# Patient Record
Sex: MALE | Race: WHITE | Employment: FULL TIME | URBAN - METROPOLITAN AREA
[De-identification: names, ages, dates, MRNs, and addresses within clinical notes are randomized per-mention and may not be internally consistent; named-entity substitution may affect disease eponyms.]

---

## 2017-03-03 ENCOUNTER — HOSPITAL ENCOUNTER (OUTPATIENT)
Age: 32
Setting detail: SPECIMEN
Discharge: HOME | End: 2017-03-03
Payer: MEDICARE

## 2017-03-03 ENCOUNTER — OFFICE VISIT (OUTPATIENT)
Dept: FAMILY MEDICINE CLINIC | Facility: CLINIC | Age: 32
End: 2017-03-03

## 2017-03-03 VITALS
HEART RATE: 69 BPM | DIASTOLIC BLOOD PRESSURE: 76 MMHG | WEIGHT: 250.8 LBS | HEIGHT: 67 IN | BODY MASS INDEX: 39.36 KG/M2 | RESPIRATION RATE: 20 BRPM | TEMPERATURE: 97.4 F | SYSTOLIC BLOOD PRESSURE: 138 MMHG

## 2017-03-03 DIAGNOSIS — L72.9 SKIN CYSTS, GENERALIZED: Primary | ICD-10-CM

## 2017-03-03 LAB
ALBUMIN SERPL-MCNC: 3.8 G/DL (ref 3.5–5.2)
ALBUMIN/GLOBULIN RATIO: 1.4 (ref 1–2.5)
ALP BLD-CCNC: 90 U/L (ref 40–129)
ALT SERPL-CCNC: 23 U/L (ref 5–41)
ANION GAP SERPL CALCULATED.3IONS-SCNC: 11 MMOL/L (ref 9–17)
AST SERPL-CCNC: 18 U/L
BILIRUB SERPL-MCNC: <0.1 MG/DL (ref 0.3–1.2)
BUN BLDV-MCNC: 9 MG/DL (ref 6–20)
BUN/CREAT BLD: ABNORMAL (ref 9–20)
CALCIUM SERPL-MCNC: 8.5 MG/DL (ref 8.6–10.4)
CHLORIDE BLD-SCNC: 102 MMOL/L (ref 98–107)
CHOLESTEROL/HDL RATIO: 4.5
CHOLESTEROL: 161 MG/DL
CO2: 25 MMOL/L (ref 20–31)
CREAT SERPL-MCNC: 1.01 MG/DL (ref 0.7–1.2)
GFR AFRICAN AMERICAN: >60 ML/MIN
GFR NON-AFRICAN AMERICAN: >60 ML/MIN
GFR SERPL CREATININE-BSD FRML MDRD: ABNORMAL ML/MIN/{1.73_M2}
GFR SERPL CREATININE-BSD FRML MDRD: ABNORMAL ML/MIN/{1.73_M2}
GLUCOSE BLD-MCNC: 93 MG/DL (ref 70–99)
HDLC SERPL-MCNC: 36 MG/DL
LDL CHOLESTEROL: 100 MG/DL (ref 0–130)
POTASSIUM SERPL-SCNC: 4.1 MMOL/L (ref 3.7–5.3)
SODIUM BLD-SCNC: 138 MMOL/L (ref 135–144)
TOTAL PROTEIN: 6.5 G/DL (ref 6.4–8.3)
TRIGL SERPL-MCNC: 126 MG/DL
VLDLC SERPL CALC-MCNC: ABNORMAL MG/DL (ref 1–30)

## 2017-03-03 PROCEDURE — 99213 OFFICE O/P EST LOW 20 MIN: CPT | Performed by: NURSE PRACTITIONER

## 2017-03-12 ASSESSMENT — ENCOUNTER SYMPTOMS
SHORTNESS OF BREATH: 0
COLOR CHANGE: 1
CHEST TIGHTNESS: 0

## 2017-05-26 DIAGNOSIS — J45.40 MODERATE PERSISTENT ASTHMA WITHOUT COMPLICATION: ICD-10-CM

## 2017-05-26 RX ORDER — ALBUTEROL SULFATE 90 UG/1
2 AEROSOL, METERED RESPIRATORY (INHALATION) EVERY 6 HOURS PRN
Qty: 1 INHALER | Refills: 0 | Status: SHIPPED | OUTPATIENT
Start: 2017-05-26 | End: 2017-06-23 | Stop reason: SDUPTHER

## 2017-06-23 DIAGNOSIS — J45.40 MODERATE PERSISTENT ASTHMA WITHOUT COMPLICATION: ICD-10-CM

## 2017-06-23 NOTE — TELEPHONE ENCOUNTER
From: Eliane Dozier  To: [address], CNP  Sent: 6/23/2017 2:32 PM EDT  Subject: Medication Renewal Request    Original authorizing provider: [address] would like a refill of the following medications:  albuterol sulfate HFA (VENTOLIN HFA) 108 (90 BASE) MCG/ACT inhaler [address], CNP]    Preferred pharmacy: [address], [address] [address] [phone] - F [phone]    Comment:

## 2017-06-26 RX ORDER — ALBUTEROL SULFATE 90 UG/1
2 AEROSOL, METERED RESPIRATORY (INHALATION) EVERY 6 HOURS PRN
Qty: 1 INHALER | Refills: 0 | Status: SHIPPED | OUTPATIENT
Start: 2017-06-26 | End: 2020-01-07

## 2017-06-26 NOTE — TELEPHONE ENCOUNTER
Patient needs to follow up in office. It is unsafe to require refills this often, needs to have controller medication.

## 2017-08-01 DIAGNOSIS — J45.40 MODERATE PERSISTENT ASTHMA WITHOUT COMPLICATION: ICD-10-CM

## 2019-10-08 ENCOUNTER — APPOINTMENT (OUTPATIENT)
Dept: GENERAL RADIOLOGY | Age: 34
End: 2019-10-08
Payer: COMMERCIAL

## 2019-10-08 ENCOUNTER — HOSPITAL ENCOUNTER (EMERGENCY)
Age: 34
Discharge: HOME | End: 2019-10-08
Attending: EMERGENCY MEDICINE
Payer: COMMERCIAL

## 2019-10-08 VITALS
TEMPERATURE: 98.1 F | HEART RATE: 74 BPM | OXYGEN SATURATION: 99 % | SYSTOLIC BLOOD PRESSURE: 119 MMHG | RESPIRATION RATE: 16 BRPM | DIASTOLIC BLOOD PRESSURE: 99 MMHG | HEIGHT: 67 IN | WEIGHT: 236 LBS | BODY MASS INDEX: 37.04 KG/M2

## 2019-10-08 DIAGNOSIS — S62.639A CLOSED FRACTURE OF TUFT OF DISTAL PHALANX OF FINGER: Primary | ICD-10-CM

## 2019-10-08 PROCEDURE — 6370000000 HC RX 637 (ALT 250 FOR IP): Performed by: NURSE PRACTITIONER

## 2019-10-08 PROCEDURE — 73130 X-RAY EXAM OF HAND: CPT

## 2019-10-08 PROCEDURE — 99283 EMERGENCY DEPT VISIT LOW MDM: CPT

## 2019-10-08 PROCEDURE — 29130 APPL FINGER SPLINT STATIC: CPT

## 2019-10-08 PROCEDURE — 73030 X-RAY EXAM OF SHOULDER: CPT

## 2019-10-08 RX ORDER — HYDROCODONE BITARTRATE AND ACETAMINOPHEN 5; 325 MG/1; MG/1
1 TABLET ORAL EVERY 6 HOURS PRN
Qty: 20 TABLET | Refills: 0 | Status: SHIPPED | OUTPATIENT
Start: 2019-10-08 | End: 2019-10-11

## 2019-10-08 RX ORDER — HYDROCODONE BITARTRATE AND ACETAMINOPHEN 5; 325 MG/1; MG/1
1 TABLET ORAL ONCE
Status: COMPLETED | OUTPATIENT
Start: 2019-10-08 | End: 2019-10-08

## 2019-10-08 RX ADMIN — HYDROCODONE BITARTRATE AND ACETAMINOPHEN 1 TABLET: 5; 325 TABLET ORAL at 09:53

## 2019-10-08 ASSESSMENT — PAIN SCALES - GENERAL
PAINLEVEL_OUTOF10: 5
PAINLEVEL_OUTOF10: 5
PAINLEVEL_OUTOF10: 4

## 2019-10-08 ASSESSMENT — PAIN DESCRIPTION - PAIN TYPE
TYPE: ACUTE PAIN
TYPE: ACUTE PAIN

## 2019-10-08 ASSESSMENT — PAIN DESCRIPTION - FREQUENCY: FREQUENCY: CONTINUOUS

## 2019-10-08 ASSESSMENT — PAIN DESCRIPTION - DESCRIPTORS: DESCRIPTORS: CONSTANT;SORE;ACHING

## 2019-10-08 ASSESSMENT — PAIN DESCRIPTION - ORIENTATION: ORIENTATION: LEFT

## 2019-10-08 ASSESSMENT — PAIN DESCRIPTION - LOCATION: LOCATION: HAND;WRIST;ELBOW;SHOULDER

## 2019-12-27 ENCOUNTER — HOSPITAL ENCOUNTER (EMERGENCY)
Age: 34
Discharge: HOME | End: 2019-12-27
Attending: EMERGENCY MEDICINE
Payer: MEDICARE

## 2019-12-27 ENCOUNTER — APPOINTMENT (OUTPATIENT)
Dept: GENERAL RADIOLOGY | Age: 34
End: 2019-12-27
Payer: MEDICARE

## 2019-12-27 VITALS
WEIGHT: 220 LBS | HEART RATE: 66 BPM | BODY MASS INDEX: 34.53 KG/M2 | OXYGEN SATURATION: 98 % | DIASTOLIC BLOOD PRESSURE: 82 MMHG | TEMPERATURE: 98.4 F | RESPIRATION RATE: 16 BRPM | SYSTOLIC BLOOD PRESSURE: 154 MMHG | HEIGHT: 67 IN

## 2019-12-27 DIAGNOSIS — M54.42 ACUTE LOW BACK PAIN WITH LEFT-SIDED SCIATICA, UNSPECIFIED BACK PAIN LATERALITY: Primary | ICD-10-CM

## 2019-12-27 PROCEDURE — 6360000002 HC RX W HCPCS: Performed by: NURSE PRACTITIONER

## 2019-12-27 PROCEDURE — 99283 EMERGENCY DEPT VISIT LOW MDM: CPT

## 2019-12-27 PROCEDURE — 96372 THER/PROPH/DIAG INJ SC/IM: CPT

## 2019-12-27 PROCEDURE — 72100 X-RAY EXAM L-S SPINE 2/3 VWS: CPT

## 2019-12-27 RX ORDER — PREDNISONE 20 MG/1
20 TABLET ORAL 2 TIMES DAILY
Qty: 10 TABLET | Refills: 0 | Status: SHIPPED | OUTPATIENT
Start: 2019-12-27 | End: 2020-01-06

## 2019-12-27 RX ORDER — DEXAMETHASONE SODIUM PHOSPHATE 10 MG/ML
10 INJECTION INTRAMUSCULAR; INTRAVENOUS ONCE
Status: COMPLETED | OUTPATIENT
Start: 2019-12-27 | End: 2019-12-27

## 2019-12-27 RX ORDER — METHOCARBAMOL 750 MG/1
750 TABLET, FILM COATED ORAL 4 TIMES DAILY
Qty: 40 TABLET | Refills: 0 | Status: SHIPPED | OUTPATIENT
Start: 2019-12-27 | End: 2020-01-06

## 2019-12-27 RX ORDER — KETOROLAC TROMETHAMINE 30 MG/ML
60 INJECTION, SOLUTION INTRAMUSCULAR; INTRAVENOUS ONCE
Status: COMPLETED | OUTPATIENT
Start: 2019-12-27 | End: 2019-12-27

## 2019-12-27 RX ORDER — ACETAMINOPHEN AND CODEINE PHOSPHATE 300; 30 MG/1; MG/1
1 TABLET ORAL EVERY 6 HOURS PRN
Qty: 12 TABLET | Refills: 0 | Status: SHIPPED | OUTPATIENT
Start: 2019-12-27 | End: 2019-12-30

## 2019-12-27 RX ADMIN — DEXAMETHASONE SODIUM PHOSPHATE 10 MG: 10 INJECTION INTRAMUSCULAR; INTRAVENOUS at 12:22

## 2019-12-27 RX ADMIN — KETOROLAC TROMETHAMINE 60 MG: 30 INJECTION, SOLUTION INTRAMUSCULAR at 12:19

## 2019-12-27 ASSESSMENT — ENCOUNTER SYMPTOMS
COLOR CHANGE: 0
CONSTIPATION: 0
WHEEZING: 0
ABDOMINAL PAIN: 0
SINUS PRESSURE: 0
VOMITING: 0
DIARRHEA: 0
SHORTNESS OF BREATH: 0
SORE THROAT: 0
COUGH: 0
RHINORRHEA: 0
NAUSEA: 0

## 2019-12-27 ASSESSMENT — PAIN SCALES - GENERAL
PAINLEVEL_OUTOF10: 7
PAINLEVEL_OUTOF10: 8
PAINLEVEL_OUTOF10: 10

## 2019-12-30 ENCOUNTER — PATIENT MESSAGE (OUTPATIENT)
Dept: NEUROSURGERY | Age: 34
End: 2019-12-30

## 2020-01-03 ENCOUNTER — TELEPHONE (OUTPATIENT)
Dept: FAMILY MEDICINE CLINIC | Age: 35
End: 2020-01-03

## 2020-01-03 NOTE — TELEPHONE ENCOUNTER
Patient is coming in to see you on the 7th. No openings before then. Patient has been to [facility] and [facility] for back pain and wife states he cant even get out of bed it is so bad. She is asking what she should do. They saw neuro and were not impressed. They forgot to order MRI and did not seem to have it together, per wife. She states patient is currently in bed crying and urinating in a urinal can given to him at discharge from the hospital.       I did advise since we have not seen him, we may not be able to treat and advised ED if worsening symptoms.            Next Visit Date:  Future Appointments   Date Time Provider Yari Bazan   1/7/2020  7:40 AM MINA Rios CNP [facility] [address] Maintenance   Topic Date Due    Varicella Vaccine (1 of 2 - 2-dose childhood series) 09/11/1984    Pneumococcal 0-64 years Vaccine (1 of 1 - PPSV23) 09/11/1989    HIV screen  09/11/1998    Flu vaccine (1) 09/01/2019    DTaP/Tdap/Td vaccine (2 - Td) 02/19/2024       No results found for: LABA1C          ( goal A1C is < 7)   No results found for: LABMICR  LDL Cholesterol (mg/dL)   Date Value   03/03/2017 100       (goal LDL is <100)   AST (U/L)   Date Value   03/03/2017 18     ALT (U/L)   Date Value   03/03/2017 23     BUN (mg/dL)   Date Value   03/03/2017 9     BP Readings from Last 3 Encounters:   12/27/19 (!) 154/82   10/08/19 (!) 119/99   03/03/17 138/76          (goal 120/80)    All Future Testing planned in [facility]              Patient Active Problem List:     Tobacco abuse     Other anxiety states     Sciatic nerve pain     Lipoma of back     Mild persistent asthma without complication     Unruptured synovial cyst of left popliteal space

## 2020-01-04 NOTE — TELEPHONE ENCOUNTER
Yes, unfortunately without being seen we can't prescribe any opioid type medications. This is just a legality issue. I could send over some pain patches or creams if they want to try these. -Anil ENRIQUEZ-CNP

## 2020-01-07 ENCOUNTER — OFFICE VISIT (OUTPATIENT)
Dept: FAMILY MEDICINE CLINIC | Age: 35
End: 2020-01-07
Payer: MEDICARE

## 2020-01-07 VITALS
RESPIRATION RATE: 20 BRPM | OXYGEN SATURATION: 97 % | BODY MASS INDEX: 37.2 KG/M2 | TEMPERATURE: 98.3 F | SYSTOLIC BLOOD PRESSURE: 126 MMHG | DIASTOLIC BLOOD PRESSURE: 80 MMHG | WEIGHT: 237.5 LBS | HEART RATE: 116 BPM

## 2020-01-07 PROCEDURE — 99214 OFFICE O/P EST MOD 30 MIN: CPT | Performed by: NURSE PRACTITIONER

## 2020-01-07 PROCEDURE — G8417 CALC BMI ABV UP PARAM F/U: HCPCS | Performed by: NURSE PRACTITIONER

## 2020-01-07 PROCEDURE — 4004F PT TOBACCO SCREEN RCVD TLK: CPT | Performed by: NURSE PRACTITIONER

## 2020-01-07 PROCEDURE — G8427 DOCREV CUR MEDS BY ELIG CLIN: HCPCS | Performed by: NURSE PRACTITIONER

## 2020-01-07 PROCEDURE — G8484 FLU IMMUNIZE NO ADMIN: HCPCS | Performed by: NURSE PRACTITIONER

## 2020-01-07 RX ORDER — GABAPENTIN 100 MG/1
100 CAPSULE ORAL 3 TIMES DAILY
COMMUNITY
Start: 2019-12-31 | End: 2020-01-09

## 2020-01-07 RX ORDER — HYDROCODONE BITARTRATE AND ACETAMINOPHEN 5; 325 MG/1; MG/1
TABLET ORAL PRN
COMMUNITY
Start: 2020-01-01 | End: 2020-01-16 | Stop reason: ALTCHOICE

## 2020-01-07 RX ORDER — TRAMADOL HYDROCHLORIDE 50 MG/1
50 TABLET ORAL EVERY 6 HOURS PRN
Qty: 20 TABLET | Refills: 0 | Status: SHIPPED | OUTPATIENT
Start: 2020-01-07 | End: 2020-01-18 | Stop reason: SDUPTHER

## 2020-01-07 ASSESSMENT — ENCOUNTER SYMPTOMS
BACK PAIN: 1
NAUSEA: 0
ABDOMINAL PAIN: 0
SHORTNESS OF BREATH: 0
DIARRHEA: 0
COUGH: 0
CONSTIPATION: 0

## 2020-01-07 ASSESSMENT — PATIENT HEALTH QUESTIONNAIRE - PHQ9
SUM OF ALL RESPONSES TO PHQ QUESTIONS 1-9: 0
SUM OF ALL RESPONSES TO PHQ QUESTIONS 1-9: 0
SUM OF ALL RESPONSES TO PHQ9 QUESTIONS 1 & 2: 0
1. LITTLE INTEREST OR PLEASURE IN DOING THINGS: 0
2. FEELING DOWN, DEPRESSED OR HOPELESS: 0

## 2020-01-07 NOTE — PROGRESS NOTES
Right Ear: External ear normal. No middle ear effusion. Left Ear: External ear normal.  No middle ear effusion. Nose: Nose normal.      Mouth/Throat:      Pharynx: Uvula midline. No oropharyngeal exudate. Eyes:      General:         Right eye: No discharge. Left eye: No discharge. Pupils: Pupils are equal, round, and reactive to light. Cardiovascular:      Rate and Rhythm: Normal rate and regular rhythm. Pulses:           Carotid pulses are 2+ on the right side and 2+ on the left side. Radial pulses are 2+ on the right side and 2+ on the left side. Dorsalis pedis pulses are 2+ on the right side and 2+ on the left side. Posterior tibial pulses are 2+ on the right side and 2+ on the left side. Heart sounds: Normal heart sounds. No murmur. Pulmonary:      Effort: Pulmonary effort is normal. No respiratory distress. Breath sounds: Normal breath sounds. No decreased breath sounds or wheezing. Abdominal:      General: Bowel sounds are normal.      Palpations: Abdomen is soft. Musculoskeletal:         General: Swelling (lumbar spine) present. Lumbar back: He exhibits decreased range of motion, tenderness, bony tenderness and pain. He exhibits no swelling. Back:         Legs:       Comments: No visible red or swollen joints to bilateral upper and lower extremities. Using crutches today. Weakness in left lower extremity    Lymphadenopathy:      Cervical: No cervical adenopathy. Skin:     General: Skin is warm and dry. Capillary Refill: Capillary refill takes less than 2 seconds. Findings: No rash. Neurological:      Mental Status: He is alert and oriented to person, place, and time. Psychiatric:         Speech: Speech normal.         Behavior: Behavior normal.         Thought Content: Thought content normal.       Diagnoses:      Diagnosis Orders   1.  Acute left-sided low back pain with left-sided sciatica  [facility] CAROLEE Lang, Neurosurgery, [address]    traMADol (ULTRAM) 50 MG tablet    MRI LUMBAR SPINE WO CONTRAST   2. Numbness and tingling of left lower extremity  Kiarra Bess CNP, Neurosurgery, [address]    MRI LUMBAR SPINE WO CONTRAST   3. Weakness of left lower extremity  Kiarra Bess CNP, Neurosurgery, [address]    MRI LUMBAR SPINE WO CONTRAST     Plan:     Items for Patient/Writer/Staff to Accomplish  Discussed acute musculoskeletal pain. Discussed course that we must take due to insurance and to heal this possible injury. Begin antiinflammatory medication as prescribed. Tramadol for pain, contract signed,   Titrate Gabapentin. Exercised provided. Referral to PT discussed. Continue with MRI as ordered. Additional imaging possible in future. Referral to Ortho possible in future. Encouraged healthy diet and exercise.  Call office with any new or worsening symptoms or concerns.     MRI order stat due to weakness. January Dyer in room for assessment, diagnosis, and treatment planning. Plan discussed with Zack HOLLEY, both parties agree on next steps. Eduardo Phylicia received counseling on the following healthy behaviors: nutrition, exercise and medication adherence  Reviewed prior labs and health maintenance. Continue current medications, diet and exercise. Discussed use, benefit, and side effects of prescribed medications. Barriers to medication compliance addressed. Patient given educational materials - see patient instructions. All patient questions answered. Patient voiced understanding. Return if symptoms worsen or fail to improve. \"There is beauty in all things if you choose to see it\"    Zack Parker, ARLET, LE[address] in [facility]  [email or web address]   Office: [phone]   Cell: [phone]    Electronically signed by MINA Cardenas CNP on 1/7/2020 at 6:09 PM

## 2020-01-09 RX ORDER — GABAPENTIN 300 MG/1
300 CAPSULE ORAL 3 TIMES DAILY
Qty: 270 CAPSULE | Refills: 0 | Status: SHIPPED | OUTPATIENT
Start: 2020-01-09 | End: 2020-05-29 | Stop reason: SDUPTHER

## 2020-01-09 RX ORDER — GABAPENTIN 400 MG/1
400 CAPSULE ORAL 3 TIMES DAILY
Qty: 270 CAPSULE | Refills: 0 | Status: SHIPPED | OUTPATIENT
Start: 2020-01-09 | End: 2020-09-15

## 2020-01-09 RX ORDER — GABAPENTIN 100 MG/1
700 CAPSULE ORAL 3 TIMES DAILY
Qty: 630 CAPSULE | Refills: 0 | Status: CANCELLED | OUTPATIENT
Start: 2020-01-09 | End: 2021-01-09

## 2020-01-15 ENCOUNTER — HOSPITAL ENCOUNTER (OUTPATIENT)
Dept: MRI IMAGING | Facility: CLINIC | Age: 35
Discharge: HOME | End: 2020-01-17
Payer: MEDICARE

## 2020-01-15 PROCEDURE — 72148 MRI LUMBAR SPINE W/O DYE: CPT

## 2020-01-16 ENCOUNTER — TELEPHONE (OUTPATIENT)
Dept: FAMILY MEDICINE CLINIC | Age: 35
End: 2020-01-16

## 2020-01-16 ENCOUNTER — OFFICE VISIT (OUTPATIENT)
Dept: NEUROSURGERY | Age: 35
End: 2020-01-16
Payer: MEDICARE

## 2020-01-16 VITALS — RESPIRATION RATE: 16 BRPM | HEART RATE: 106 BPM | DIASTOLIC BLOOD PRESSURE: 98 MMHG | SYSTOLIC BLOOD PRESSURE: 150 MMHG

## 2020-01-16 PROCEDURE — 99203 OFFICE O/P NEW LOW 30 MIN: CPT | Performed by: NURSE PRACTITIONER

## 2020-01-16 PROCEDURE — G8484 FLU IMMUNIZE NO ADMIN: HCPCS | Performed by: NURSE PRACTITIONER

## 2020-01-16 PROCEDURE — G8427 DOCREV CUR MEDS BY ELIG CLIN: HCPCS | Performed by: NURSE PRACTITIONER

## 2020-01-16 PROCEDURE — G8417 CALC BMI ABV UP PARAM F/U: HCPCS | Performed by: NURSE PRACTITIONER

## 2020-01-16 PROCEDURE — 4004F PT TOBACCO SCREEN RCVD TLK: CPT | Performed by: NURSE PRACTITIONER

## 2020-01-16 NOTE — PROGRESS NOTES
Onset    Diabetes Father         Type 1    Heart Disease Maternal Grandmother     Diabetes Maternal Grandfather      Current Outpatient Medications on File Prior to Visit   Medication Sig Dispense Refill    gabapentin (NEURONTIN) 300 MG capsule Take 1 capsule by mouth 3 times daily for 90 days. 270 capsule 0    gabapentin (NEURONTIN) 400 MG capsule Take 1 capsule by mouth 3 times daily for 90 days. 270 capsule 0     No current facility-administered medications on file prior to visit. Social History     Tobacco Use    Smoking status: Current Every Day Smoker     Packs/day: 1.00     Years: 20.00     Pack years: 20.00     Types: Cigarettes     Start date: 8/24/1995    Smokeless tobacco: Never Used   Substance Use Topics    Alcohol use: Yes     Alcohol/week: 0.0 standard drinks     Comment: social    Drug use: No       No Known Allergies    Review of Systems  Constitutional: Negative for activity change and appetite change. HENT: Negative for ear pain and facial swelling. Eyes: Negative for discharge and itching. Respiratory: Negative for choking and chest tightness. Cardiovascular: Negative for chest pain and leg swelling. Gastrointestinal: Negative for nausea and abdominal pain. Endocrine: Negative for cold intolerance and heat intolerance. Genitourinary: Negative for frequency and flank pain. Musculoskeletal: Negative for myalgias and joint swelling. Skin: Negative for rash and wound. Allergic/Immunologic: Negative for environmental allergies and food allergies. Hematological: Negative for adenopathy. Does not bruise/bleed easily. Psychiatric/Behavioral: Negative for self-injury. The patient is not nervous/anxious. Physical Exam:      BP (!) 150/98   Pulse 106   Resp 16   Estimated body mass index is 37.2 kg/m² as calculated from the following:    Height as of 12/27/19: 5' 7\" (1.702 m). Weight as of 1/7/20: 237 lb 8 oz (107.7 kg). General:  Shasta Councilman is a 37 y.o. year old male who appears his stated age. HEENT: Normocephalic atraumatic. Neck supple. Chest: regular rate; pulses equal  Abdomen: Soft nontender nondistended. Ext: DP and PT pulses 2+, good cap refill  Neuro    Mentation  Appropriate affect  Registration intact  Orientation intact    Cranial Nerves:   Pupils equal and reactive to light  Extraocular motion intact  Face and shrug symmetric  Tongue midline  No dysarthria  v1-3 sensation symmetric, masseter tone symmetric  Hearing symmetric and intact    Sensation: Mild decreased along L4 distribution to left leg    Motor  L deltoid 5/5; R deltoid 5/5  L biceps 5/5; R biceps 5/5  L triceps 5/5; R triceps 5/5  L wrist extension 5/5; R wrist extension 5/5  L intrinsics 5/5; R intrinsics 5/5     L iliopsoas 5/5 , R iliopsoas 5/5  L quadriceps 5/5; R quadriceps 5/5  L Dorsiflexion 5/5; R dorsiflexion 5/5  L Plantarflexion 5/5; R plantarflexion 5/5  L EHL 5/5; R EHL 5/5    Reflexes  L Brachioradialis 2+/4; R brachioradialis 2+/4  L Biceps 2+/4; R Biceps 2+/4  L Triceps 2+/4; R Triceps 2+/4  L Patellar 2+/4: R Patellar 2+/4  L Achilles 2+/4; R Achilles 2+/4    hoffmans L: neg  hoffmans R: neg  Clonus L: neg  Clonus R: neg  Babinski L: neg  Babinski R: neg    ALEXIS: Negative  Straight leg raise: Negative    Studies Review:     MRI lumbar spine 1/14/2020:  Congenital spinal canal stenosis.       Small left foraminal disc extrusion at L3-L4 resulting in severe left   foraminal stenosis and compression of the exiting left L3 nerve root.       Moderate right foraminal stenosis at L3-L4.       Small left paracentral disc protrusion at L5-S1 resulting in mild left   lateral recess stenosis. Assessment and Plan:      1. Lumbar radiculopathy          Plan: Patient is experiencing axial back pain with left sided radicular symptoms. This does correspond with severe left foraminal stenosis identified on MRI.   The patient has not attempted physical therapy or pain management evaluation as of yet. As he is not experiencing any focal motor deficits, would recommend trial of physical therapy as well as evaluation by pain specialist to consider injections. He has experienced modest improvement in symptoms over the last 3 weeks although pain is still significant. Will reevaluate in approximately 10 to 12 weeks. Patient to notify office with any worsening of radicular symptoms, weakness, progressive numbness/tingling. Followup: Return in about 3 months (around 4/16/2020), or if symptoms worsen or fail to improve. Prescriptions Ordered:  No orders of the defined types were placed in this encounter. Orders Placed:  Orders Placed This Encounter   Procedures    [facility]     Referral Priority:   Routine     Referral Type:   Eval and Treat     Referral Reason:   Specialty Services Required     Requested Specialty:   Physical Therapy     Number of Visits Requested:   1    Ambulatory referral to Pain Clinic     Referral Priority:   Routine     Referral Type:   Eval and Treat     Referral Reason:   Specialty Services Required     Referred to Provider:   Jorgito Robertson MD     Requested Specialty:   Pain Management     Number of Visits Requested:   1        Electronically signed by MINA Salazar CNP on 1/16/2020 at 3:50 PM    Please note that this chart was generated using voice recognition Dragon dictation software. Although every effort was made to ensure the accuracy of this automated transcription, some errors in transcription may have occurred.

## 2020-01-18 RX ORDER — TRAMADOL HYDROCHLORIDE 50 MG/1
50 TABLET ORAL EVERY 6 HOURS PRN
Qty: 20 TABLET | Refills: 0 | Status: SHIPPED | OUTPATIENT
Start: 2020-01-18 | End: 2020-01-30 | Stop reason: SDUPTHER

## 2020-01-20 ENCOUNTER — HOSPITAL ENCOUNTER (OUTPATIENT)
Dept: PHYSICAL THERAPY | Age: 35
Setting detail: THERAPIES SERIES
Discharge: HOME | End: 2020-01-20
Payer: MEDICARE

## 2020-01-20 PROCEDURE — 97012 MECHANICAL TRACTION THERAPY: CPT

## 2020-01-20 PROCEDURE — 97162 PT EVAL MOD COMPLEX 30 MIN: CPT

## 2020-01-20 NOTE — CONSULTS
stenosis.       Small left foraminal disc extrusion at L3-L4 resulting in severe left   foraminal stenosis and compression of the exiting left L3 nerve root.       Moderate right foraminal stenosis at L3-L4.       Small left paracentral disc protrusion at L5-S1 resulting in mild left   lateral recess stenosis. 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, has been off for 4-6 weeks, hoping to return to, when returns will be able to limit to somewhat lighter activities    Pain:  [x] Yes  [] No Location: LB, L LE Pain Rating: (0-10 scale) 8/10  Pain altered Tx:  [] Yes  [x] No  Action:  Symptoms:  [] Improving [] Worsening [x] Same    Sleep: [] OK    [x] Disturbed-has been taking sleeping pills to help, but still wakes up every night about 3 am; before this stomach sleeper-feels like back is arched, has not been doing per Dr instruction    Objective:      STRENGTH  STRENGTH  ROM    Left Right  Left Right Cervical    C5 Shld Abd   L1-2 Hip Flex 3-pp 4- Flexion    Shld Flexion   Hip Abd   Extension    Shld IR   L3-4 Knee Ext 3-pp 4p Rotation L R   Shld ER   L4 Ankle DF 4-p 4 Sidebend L R   C6 Elb Flex   L5 EHL   Retraction    C7 Elb Ext   S1 Plant. Flex   Lumbar    C8 EPL   Abdominals   Flexion 43°pp   T1 Fing Abd   Erector Spinae   Extension 4°      HS 3+pp 4p Rotation L  R      Hip ext  3-pp 3+p Sidebend L R         UE/LE                                                                  TESTS (+/-) LEFT RIGHT Not Tested   SLR [] sit [] supine   []   Hamstring (SLR)   []   SKTC + +pulls L hip []   DKTC + + []   LTR - + []   Karolyn Tests ? Pain ?  Pain No Change Not Tested RFIS [] [] [] [x]   LUIS [] [] [] [x]   RFIL [] [] [] [x]   REIL [] [] [] [x]   Rep Prot [] [] [] [x]   Rep Retract [] [] [] [x]   Hooklying-most comfortable position 5/10, straightening L leg out causes icnreased pain, tension  Prone increasing pain, back 6/10, going down leg 8/10;  RUTH increases pain in leg 8-9/10, no change in back    OBSERVATION No Deficit Deficit Not Tested Comments   Posture       Forward Head [] [x] []    Rounded Shoulders [] [x] []    Slumped Sitting [] [x] []    Palpation [] [x] [] Tender L LB, L post thigh   Sensation [] [x] [] See subjective, intact light touch   Edema [] [] [x]    Neurological [] [] [x]          Comments: Pt very talkative during evaluation, about symptoms, recent history, mild difficulty keeping on track. Pt's wife and son present for initial evaluation. Assessment:  Problems:    [x] ? Back Pain: into L LE up to 10/10    [] ? Cervical Pain:    [x] ? ROM: Lumbar     [x] ? Strength: B LE   [x] ? Function: Oswestry 88% loss of function  [x] Postural Deviations  [] Gait Deviations  [] Other:      STG: (to be met in 12 treatments)  1. ? Pain: LB, L LE 5/10 average pain, 7/10 at worst  2. ? ROM:Lumbar flex 55°, ext 12°  3. ? Strength: L hip 3+/5, L knee 4-/5, L ankle 4/5, R hip flex 4/5, ext 4-/5  4. ? Function: Oswestry 68% loss of function  5. Pt report increased tolerance to standing, walking   6. Independent with Home Exercise Programs    LTG: (to be met in 18 treatments)  1. ? Pain: LB, L LE 3/10 average pain, 5/10 at worst  2. ? ROM:Lumbar flex 65°, ext 20°  3. ? Strength: L hip 4-/5, L knee 4/5, R hip ext 4/5, R knee 4+/5  4. ? Function: Oswestry 48% loss of function  5. Pt report able to standing 20 min w/out increased pain, able to walk to clinic from parking garage w/out increased pain  6.  Pt demonstrate good postural awareness and correct body mechanics for bending, lifting        Patient goals: less pain, be able to walk without pain, return to work    Rehab Potential:  [x] Good  [] Fair  [] Poor   Suggested Professional Referral:  [x] No  [] Yes:  Barriers to Goal Achievement:  [x] No  [] Yes:  Domestic Concerns:  [x] No  [] Yes:    Pt. Education:  [x] Plans/Goals, Risks/Benefits discussed  [] Home exercise program  Method of Education: [x] Verbal  [] Demo  [] Written  Comprehension of Education:  [x] Verbalizes understanding. [] Demonstrates understanding. [] Needs Review. [] Demonstrates/verbalizes understanding of HEP/Ed previously given. Treatment Plan:  [x] Therapeutic Exercise   09843  [] Iontophoresis: 4 mg/mL Dexamethasone Sodium Phosphate  mAmin  26288   [x] Therapeutic Activity  47559 [] Vasopneumatic cold with compression  10839    [] Gait Training   63084 [x] Ultrasound   10500   [] Neuromuscular Re-education  25320 [x] Electrical Stimulation Unattended  06024   [x] Manual Therapy  42711 [] Electrical Stimulation Attended  18332   [x] Instruction in HEP  [x] Lumbar/Cervical Traction  56015   [] Aquatic Therapy   13568 [x] Cold/hotpack    [] Massage   05911      [x] Dry Needling, 1 or 2 muscles  80858   [] Biofeedback, first 15 minutes   46227  [] Biofeedback, additional 15 minutes   88684 [x] Dry Needling, 3 or more muscles  04887     []  Medication allergies reviewed for use of    Dexamethasone Sodium Phosphate 4mg/ml     with iontophoresis treatments. Pt is not allergic.     Frequency:  2-3 x/week for 18 visits    Todays Treatment:  Modalities:   Treatment Location  Left      Right                          Position    []          []  [] Supine    [] Prone   [] Side lying  [] Sitting          Treatment Modality    Hot Pack:    [] Large   [] Medium    [] Cervical     _____ min    Cold Pack   [] Large   [] Medium    [] Cervical     _____ min    Vasocompression    __° temp    ____pressure     _____ min    Electrical Stim:    [] IFC     [] MFAC      [] HVPC                          Protocol:_______  _____X_____min #Channels:  [] 1        [] 2       [] Other:    Ultrasound: ______W/cm2 x  ______min              [] 1MHz   [] 3Mhz                      Duty Factor: [] 100%  [] 50%   [] 20%                  Add: [] Lidex   [] Stim    [] Gel pad       Massage:    [] Soft Tissue   [] Cross Friction                      [] Ice ____min    Iontophoresis:  IOGEL:  [] 1.5ml   [] 2.5ml   [] 3.5ml                                            [] 1.0ml   [] 1.3ml                 Dosage:  [] 24 mA/min   [] 40 mA/min  []  80 mA/min                Channels:   [] 1    [] 2                [] 4mg/ml Dexamethasone Sodium Phosphate Dosage 24-80 mAmin                [] Acetic Acid       [] Other     [] Drug allergies reviewed    _______Initials           _______Date   1/21 Traction:   [] Prone   [x] Supine   X _15_min               [x] Lumbar x _80 max, 40 lbs min___lbs      [] Cervical x _____lbs               [] Continuous     [x] Intermittent  -   On:__30_x Off:_20__  15° angle of pull, w/stool under legs    Other:           Precautions:  Exercises:  Exercise Reps/ Time Weight/ Level Comments                                 Other: Initiated pelvic traction, Pt reports pressure from belts is uncomfortable on back, but has decreased pain in leg and easier sit-stand after traction.       Specific Instructions for next treatment: begin hooklying spinal stab ex, monitor response to traction, add HP ES for symptom control; postural ed, postural ex, correct body mechanics for job related activities        Evaluation Complexity:  History (Personal factors, comorbidities) [] 0 [x] 1-2 [] 3+   Exam (limitations, restrictions) [] 1-2 [] 3 [x] 4+   Clinical presentation (progression) [] Stable [x] Evolving  [] Unstable   Decision Making [] Low [x] Moderate [] High    [] Low Complexity [x] Moderate Complexity [] High Complexity       Treatment Charges: Mins Units   [x] Evaluation       []  Low       [x]  Moderate       []  High 41 1   [x]  Modalities Traction 10 1   []  Ther Exercise     []  Manual Therapy     []  Ther Activities     []  Aquatics     []  Vasocompression     []  Other       TOTAL TREATMENT TIME: 52 min    Time in: 0912      Time out: 1055    Electronically signed by: Miah Perez PT          Physician Signature:________________________________Date:__________________  By signing above or cosigning this note, I have reviewed this plan of care and certify a need for medically necessary rehabilitation services.      *PLEASE SIGN ABOVE AND FAX BACK ALL PAGES*

## 2020-01-21 ENCOUNTER — HOSPITAL ENCOUNTER (OUTPATIENT)
Dept: PHYSICAL THERAPY | Age: 35
Setting detail: THERAPIES SERIES
Discharge: HOME | End: 2020-01-21
Payer: MEDICARE

## 2020-01-21 PROCEDURE — 97012 MECHANICAL TRACTION THERAPY: CPT

## 2020-01-21 PROCEDURE — 97110 THERAPEUTIC EXERCISES: CPT

## 2020-01-21 NOTE — FLOWSHEET NOTE
care.   [] Other:      Time In: 1515            Time Out: [address] [facility]    Electronically signed by:  Victoria Singer PTA

## 2020-01-29 ENCOUNTER — HOSPITAL ENCOUNTER (OUTPATIENT)
Dept: PHYSICAL THERAPY | Age: 35
Setting detail: THERAPIES SERIES
Discharge: HOME | End: 2020-01-29
Payer: MEDICARE

## 2020-01-29 ENCOUNTER — PATIENT MESSAGE (OUTPATIENT)
Dept: FAMILY MEDICINE CLINIC | Age: 35
End: 2020-01-29

## 2020-01-29 PROCEDURE — 97140 MANUAL THERAPY 1/> REGIONS: CPT

## 2020-01-29 PROCEDURE — 97110 THERAPEUTIC EXERCISES: CPT

## 2020-01-29 PROCEDURE — 97012 MECHANICAL TRACTION THERAPY: CPT

## 2020-01-29 NOTE — FLOWSHEET NOTE
due to realign his hips with his L leg 5 mm longer than the R. No audible popping or correction. After 2x attempts, changed to MET with improved alignment. Specific Instructions for next treatment:    Treatment Charges: Mins Units   []  Modalities-hot pack     [x]  Ther Exercise 33 2   []  Manual Therapy 15 1   []  Ther Activities     []  Aquatics     []  Vasocompression     [x]  Other-traction 15 1   Total Treatment time 63 4       Assessment: [] Progressing toward goals. [x] No change. Increased pull on lumbar traction with improved sensation from patient. Completed manual MET to correct leg length discrepancy. Held exercise log and focused on stretching. Berve pain immediately with passive hamstring stretch before 30 degrees. Passive hip flexion off the side of the table elicited lateral hip pain. Patient c/o pain was not consistent with the targeted muscle. Changed focus to prone lying where patient not pain in posterior calf was minimal but still completely numb from patella to ankle. Possible damage to superficial peroneal  Nerve. [x] Other: Patient has pain management next Thursday. STG: (to be met in 12 treatments)  1. ? Pain: LB, L LE 5/10 average pain, 7/10 at worst  2. ? ROM:Lumbar flex 55°, ext 12°  3. ? Strength: L hip 3+/5, L knee 4-/5, L ankle 4/5, R hip flex 4/5, ext 4-/5  4. ? Function: Oswestry 68% loss of function  5. Pt report increased tolerance to standing, walking   6. Independent with Home Exercise Programs     LTG: (to be met in 18 treatments)  1. ? Pain: LB, L LE 3/10 average pain, 5/10 at worst  2. ? ROM:Lumbar flex 65°, ext 20°  3. ? Strength: L hip 4-/5, L knee 4/5, R hip ext 4/5, R knee 4+/5  4. ? Function: Oswestry 48% loss of function  5. Pt report able to standing 20 min w/out increased pain, able to walk to clinic from parking garage w/out increased pain  6.  Pt demonstrate good postural awareness and correct body mechanics for bending,

## 2020-01-30 RX ORDER — TRAMADOL HYDROCHLORIDE 50 MG/1
50 TABLET ORAL 2 TIMES DAILY PRN
Qty: 60 TABLET | Refills: 0 | Status: SHIPPED | OUTPATIENT
Start: 2020-01-30 | End: 2020-02-28 | Stop reason: SDUPTHER

## 2020-01-30 NOTE — TELEPHONE ENCOUNTER
From: Hemant Cisneros  To: MINA De León - CNP  Sent: 1/29/2020 4:02 PM EST  Subject: Prescription Question    I wanted to see if i can request a refill on my tramadol.  I see pain management on 2/7 i believe, but thats a week away and am currently doing physical therapy

## 2020-02-04 ENCOUNTER — HOSPITAL ENCOUNTER (OUTPATIENT)
Dept: PHYSICAL THERAPY | Age: 35
Setting detail: THERAPIES SERIES
Discharge: HOME | End: 2020-02-04
Payer: MEDICARE

## 2020-02-12 ENCOUNTER — HOSPITAL ENCOUNTER (OUTPATIENT)
Dept: PHYSICAL THERAPY | Age: 35
Setting detail: THERAPIES SERIES
Discharge: HOME | End: 2020-02-12
Payer: MEDICARE

## 2020-02-12 PROCEDURE — G0283 ELEC STIM OTHER THAN WOUND: HCPCS

## 2020-02-12 PROCEDURE — 97012 MECHANICAL TRACTION THERAPY: CPT

## 2020-02-12 PROCEDURE — 97110 THERAPEUTIC EXERCISES: CPT

## 2020-02-24 ENCOUNTER — OFFICE VISIT (OUTPATIENT)
Dept: NEUROSURGERY | Age: 35
End: 2020-02-24
Payer: MEDICARE

## 2020-02-24 VITALS
WEIGHT: 248.8 LBS | DIASTOLIC BLOOD PRESSURE: 92 MMHG | HEART RATE: 72 BPM | BODY MASS INDEX: 38.97 KG/M2 | SYSTOLIC BLOOD PRESSURE: 145 MMHG

## 2020-02-24 PROCEDURE — G8417 CALC BMI ABV UP PARAM F/U: HCPCS | Performed by: NURSE PRACTITIONER

## 2020-02-24 PROCEDURE — G8484 FLU IMMUNIZE NO ADMIN: HCPCS | Performed by: NURSE PRACTITIONER

## 2020-02-24 PROCEDURE — 99213 OFFICE O/P EST LOW 20 MIN: CPT | Performed by: NURSE PRACTITIONER

## 2020-02-24 PROCEDURE — G8427 DOCREV CUR MEDS BY ELIG CLIN: HCPCS | Performed by: NURSE PRACTITIONER

## 2020-02-24 PROCEDURE — 4004F PT TOBACCO SCREEN RCVD TLK: CPT | Performed by: NURSE PRACTITIONER

## 2020-02-24 NOTE — PROGRESS NOTES
[facility] [address] [address] Lore Guevara  MOB # 2 Dorean Sample  [address] [facility] [address]  Dept: [phone]    Patient:  Arley Michelle  YOB: 1985  Date: 20    The patient is a 37 y.o. male who presents today for consult of the following problems:     Chief Complaint   Patient presents with    Back Pain     Lumbar radiculopathy. Pt is having issues in PT and feels pain is getting worse. HPI:     Arley Michelle is a 37 y.o. male on whom neurosurgical consultation was requested by MINA Owen CNP for management of back and leg pain. Pt has had low back pain for years. Pain is a 5/10, described as a dull, throb in the back. Pain also present to left thigh and kneecap. Pain has been present for the last approximately 2 months. Does have a history of intermittent exacerbations of low back pain. Does overall feel that he has improved. Leg symptoms have improved. Continues to have some axial pain, worsened with certain movements. Rest overall improves symptoms. Has completed approximately 4-5 physical therapy sessions. Does not feel he experienced a great deal of relief. Did also have an appointment scheduled with pain specialist, patient canceled this. Groin pain: No  Saddle anesthesia: No  Hip Pain: No  Bowel/bladder incontinence: No  Constipation or Urinary retention: No    LIMITATIONS    Pain significantly limiting from a daily standpoint. Assistive devices: none  Daily pharmacologic pain control include: nothing  Back versus le/50    MANAGEMENT     Prior Surgery: No  Prior to 1yr ago: In the last year:    Injections: No  Improvement: n/a    Physical Therapy: Yes   Chiropractic Interventions: No    Types of injections/responses: n/a    History:     No past medical history on file.   Past Surgical History:   Procedure Laterality Date    CYST REMOVAL      FEMUR FRACTURE SURGERY       Family History   Problem Relation Age of Onset    Diabetes therapy sessions. Was also scheduled to see pain specialist to discuss possible injections, patient canceled this appointment. Has he has had only minimal conservative measures to date, recommend proceeding with pain management referral as previously provided. Patient does not have any current focal motor deficits present and is slowly, progressively improving. No red flag symptoms. Would encourage to continue working on weight reduction, smoking cessation. Will have patient follow-up in approximately 3 months after participating in physical therapy for more sessions as well as being evaluated by pain specialist.  Amrita Silverman to return sooner with any onset of weakness, significant worsening of symptoms. Followup: Return in about 3 months (around 5/24/2020), or if symptoms worsen or fail to improve. Prescriptions Ordered:  No orders of the defined types were placed in this encounter. Orders Placed:  No orders of the defined types were placed in this encounter. Electronically signed by MINA Carlos CNP on 2/24/2020 at 4:00 PM    Please note that this chart was generated using voice recognition Dragon dictation software. Although every effort was made to ensure the accuracy of this automated transcription, some errors in transcription may have occurred.

## 2020-02-28 NOTE — TELEPHONE ENCOUNTER
Last visit: 1/7/2020  Last Med refill: 1/30/2020  Does patient have enough medication for 72 hours: Yes    Next Visit Date:  Future Appointments   Date Time Provider Yari Bazan   5/27/2020  3:00 PM [address], APRN - CNP [facility] [address] Maintenance   Topic Date Due    Varicella vaccine (1 of 2 - 2-dose childhood series) 09/11/1984    Pneumococcal 0-64 years Vaccine (1 of 1 - PPSV23) 09/11/1989    HIV screen  09/11/1998    Flu vaccine (1) 09/01/2019    DTaP/Tdap/Td vaccine (2 - Td) 02/19/2024    Shingles Vaccine (1 of 2) 09/11/2033    Hepatitis A vaccine  Aged Out    Hepatitis B vaccine  Aged Out    Hib vaccine  Aged Out    Meningococcal (ACWY) vaccine  Aged Out       No results found for: LABA1C          ( goal A1C is < 7)   No results found for: LABMICR  LDL Cholesterol (mg/dL)   Date Value   03/03/2017 100       (goal LDL is <100)   AST (U/L)   Date Value   03/03/2017 18     ALT (U/L)   Date Value   03/03/2017 23     BUN (mg/dL)   Date Value   03/03/2017 9     BP Readings from Last 3 Encounters:   02/24/20 (!) 145/92   01/16/20 (!) 150/98   01/07/20 126/80          (goal 120/80)    All Future Testing planned in [facility]              Patient Active Problem List:     Tobacco abuse     Other anxiety states     Sciatica     Lipoma of back     Mild persistent asthma     Unruptured synovial cyst of left popliteal space

## 2020-03-03 RX ORDER — TRAMADOL HYDROCHLORIDE 50 MG/1
50 TABLET ORAL 2 TIMES DAILY PRN
Qty: 60 TABLET | Refills: 0 | Status: SHIPPED | OUTPATIENT
Start: 2020-03-03 | End: 2020-03-31 | Stop reason: SDUPTHER

## 2020-03-31 RX ORDER — TRAMADOL HYDROCHLORIDE 50 MG/1
50 TABLET ORAL 2 TIMES DAILY PRN
Qty: 60 TABLET | Refills: 0 | Status: SHIPPED | OUTPATIENT
Start: 2020-03-31 | End: 2020-04-26 | Stop reason: SDUPTHER

## 2020-03-31 NOTE — TELEPHONE ENCOUNTER
Lov: 1/7/20  Lrf: 3/3/20   Na: 5/27/20    Health Maintenance   Topic Date Due    Varicella vaccine (1 of 2 - 2-dose childhood series) 09/11/1984    Pneumococcal 0-64 years Vaccine (1 of 1 - PPSV23) 09/11/1989    HIV screen  09/11/1998    Flu vaccine (1) 09/01/2019    DTaP/Tdap/Td vaccine (2 - Td) 02/19/2024    Hepatitis A vaccine  Aged Out    Hepatitis B vaccine  Aged Out    Hib vaccine  Aged Out    Meningococcal (ACWY) vaccine  Aged Out             (applicable per patient's age: Cancer Screenings, Depression Screening, Fall Risk Screening, Immunizations)    LDL Cholesterol (mg/dL)   Date Value   03/03/2017 100     AST (U/L)   Date Value   03/03/2017 18     ALT (U/L)   Date Value   03/03/2017 23     BUN (mg/dL)   Date Value   03/03/2017 9      (goal A1C is < 7)   (goal LDL is <100) need 30-50% reduction from baseline     BP Readings from Last 3 Encounters:   02/24/20 (!) 145/92   01/16/20 (!) 150/98   01/07/20 126/80    (goal /80)      All Future Testing planned in [facility]:      Next Visit Date:  Future Appointments   Date Time Provider Yari Bazan   5/27/2020  3:00 PM [address], APRN - [facility] [address]            Patient Active Problem List:     Tobacco abuse     Other anxiety states     Sciatica     Lipoma of back     Mild persistent asthma     Unruptured synovial cyst of left popliteal space

## 2020-04-20 ENCOUNTER — TELEPHONE (OUTPATIENT)
Dept: PAIN MANAGEMENT | Age: 35
End: 2020-04-20

## 2020-04-20 NOTE — TELEPHONE ENCOUNTER
Attempted to reschedule new patient appointment as a virtual visit.  Pt declined and would like to be called once regular in office appointments resume

## 2020-04-28 NOTE — TELEPHONE ENCOUNTER
Last OARRS Review-03/03/2020  Last Office Visit-01/07/2020  Return To Office- s/s worsen   Next Appointment Date-0  Last Refill Date-03/31/2020    60 Tabs   Number of Refills Given- 0      Health Maintenance   Topic Date Due    Varicella vaccine (1 of 2 - 2-dose childhood series) 09/11/1984    Pneumococcal 0-64 years Vaccine (1 of 1 - PPSV23) 09/11/1989    HIV screen  09/11/1998    Flu vaccine (Season Ended) 09/01/2020    DTaP/Tdap/Td vaccine (2 - Td) 02/19/2024    Hepatitis A vaccine  Aged Out    Hepatitis B vaccine  Aged Out    Hib vaccine  Aged Out    Meningococcal (ACWY) vaccine  Aged Out             (applicable per patient's age: Cancer Screenings, Depression Screening, Fall Risk Screening, Immunizations)    LDL Cholesterol (mg/dL)   Date Value   03/03/2017 100     AST (U/L)   Date Value   03/03/2017 18     ALT (U/L)   Date Value   03/03/2017 23     BUN (mg/dL)   Date Value   03/03/2017 9      (goal A1C is < 7)   (goal LDL is <100) need 30-50% reduction from baseline     BP Readings from Last 3 Encounters:   02/24/20 (!) 145/92   01/16/20 (!) 150/98   01/07/20 126/80    (goal /80)      All Future Testing planned in [facility]:      Next Visit Date:  Future Appointments   Date Time Provider Yari Bazan   5/1/2020  9:00 AM [facility] Lilia Parrish MD [address] [place] [address]   5/27/2020  3:00 PM MINA Silveria - CNP [facility]            Patient Active Problem List:     Tobacco abuse     Other anxiety states     Sciatica     Lipoma of back     Mild persistent asthma     Unruptured synovial cyst of left popliteal space

## 2020-04-29 RX ORDER — TRAMADOL HYDROCHLORIDE 50 MG/1
50 TABLET ORAL 2 TIMES DAILY PRN
Qty: 60 TABLET | Refills: 0 | Status: SHIPPED | OUTPATIENT
Start: 2020-04-29 | End: 2020-05-29 | Stop reason: SDUPTHER

## 2020-05-29 RX ORDER — TRAMADOL HYDROCHLORIDE 50 MG/1
50 TABLET ORAL 2 TIMES DAILY PRN
Qty: 60 TABLET | Refills: 0 | Status: SHIPPED | OUTPATIENT
Start: 2020-05-29 | End: 2020-06-27 | Stop reason: SDUPTHER

## 2020-05-29 RX ORDER — GABAPENTIN 300 MG/1
300 CAPSULE ORAL 3 TIMES DAILY
Qty: 90 CAPSULE | Refills: 0 | Status: SHIPPED | OUTPATIENT
Start: 2020-05-29 | End: 2020-11-13

## 2020-06-09 NOTE — TELEPHONE ENCOUNTER
Wife called in for kids. Kids have lice. Per AL-all members should have medications called into pharm for treatment. Pended.

## 2020-06-18 ENCOUNTER — INITIAL CONSULT (OUTPATIENT)
Dept: PAIN MANAGEMENT | Age: 35
End: 2020-06-18
Payer: MEDICARE

## 2020-06-18 VITALS
WEIGHT: 250.2 LBS | HEART RATE: 79 BPM | DIASTOLIC BLOOD PRESSURE: 98 MMHG | TEMPERATURE: 98.4 F | SYSTOLIC BLOOD PRESSURE: 139 MMHG | HEIGHT: 67 IN | BODY MASS INDEX: 39.27 KG/M2

## 2020-06-18 PROCEDURE — 99244 OFF/OP CNSLTJ NEW/EST MOD 40: CPT | Performed by: PAIN MEDICINE

## 2020-06-18 RX ORDER — PERMETHRIN 1 MG/100ML
LOTION TOPICAL
COMMUNITY
Start: 2020-06-11 | End: 2020-06-18 | Stop reason: ALTCHOICE

## 2020-06-18 ASSESSMENT — ENCOUNTER SYMPTOMS
BOWEL INCONTINENCE: 0
COUGH: 1
WHEEZING: 1
SHORTNESS OF BREATH: 1
BACK PAIN: 1
ALLERGIC/IMMUNOLOGIC NEGATIVE: 1
ABDOMINAL PAIN: 1

## 2020-06-18 NOTE — PROGRESS NOTES
Cardiovascular: Negative. Negative for chest pain. Respiratory: Positive for cough, shortness of breath and wheezing. Skin: Negative. Musculoskeletal: Positive for back pain. Gastrointestinal: Positive for abdominal pain. Negative for bowel incontinence. Genitourinary: Positive for dysuria and frequency. Negative for bladder incontinence. Neurological: Positive for numbness, tingling and weakness. Psychiatric/Behavioral: Negative. Allergic/Immunologic: Negative. Physical Exam:  BP (!) 139/98   Pulse 79   Temp 98.4 °F (36.9 °C) (Oral)   Ht 5' 7\" (1.702 m)   Wt 250 lb 3.2 oz (113.5 kg)   BMI 39.19 kg/m²     Physical Exam  Vitals signs reviewed. Constitutional:       General: He is awake. Appearance: He is not ill-appearing or diaphoretic. HENT:      Right Ear: External ear normal.      Left Ear: External ear normal.   Eyes:      General:         Right eye: No discharge. Left eye: No discharge. Conjunctiva/sclera: Conjunctivae normal.   Neck:      Thyroid: No thyromegaly. Trachea: No tracheal deviation. Pulmonary:      Effort: Pulmonary effort is normal. No respiratory distress. Breath sounds: No stridor. Musculoskeletal:      Lumbar back: He exhibits tenderness. He exhibits no edema and no deformity. Skin:     General: Skin is warm and dry. Neurological:      Mental Status: He is alert and oriented to person, place, and time. Gait: Gait normal.      Deep Tendon Reflexes: Reflexes are normal and symmetric. Psychiatric:         Attention and Perception: Attention and perception normal.         Behavior: Behavior normal.         Record/Diagnostics Review:    As above, I did review the imaging    Assessment:  1. Lumbar radiculopathy    2. DDD (degenerative disc disease), lumbar    3. Lumbosacral spondylosis without myelopathy    4.  Disc displacement, lumbar        Treatment Plan:  DISCUSSION: Treatment options discussed with patient and all

## 2020-07-13 ENCOUNTER — HOSPITAL ENCOUNTER (OUTPATIENT)
Dept: PREADMISSION TESTING | Age: 35
Setting detail: SPECIMEN
Discharge: HOME | End: 2020-07-17
Payer: MEDICARE

## 2020-07-13 ENCOUNTER — TELEPHONE (OUTPATIENT)
Dept: PAIN MANAGEMENT | Age: 35
End: 2020-07-13

## 2020-07-13 PROCEDURE — U0004 COV-19 TEST NON-CDC HGH THRU: HCPCS

## 2020-07-14 ENCOUNTER — ANESTHESIA EVENT (OUTPATIENT)
Dept: OPERATING ROOM | Age: 35
End: 2020-07-14
Payer: MEDICARE

## 2020-07-14 LAB
SARS-COV-2, PCR: NOT DETECTED
SARS-COV-2, RAPID: NORMAL
SARS-COV-2: NORMAL
SOURCE: NORMAL

## 2020-07-14 RX ORDER — SODIUM CHLORIDE 0.9 % (FLUSH) 0.9 %
10 SYRINGE (ML) INJECTION PRN
Status: CANCELLED | OUTPATIENT
Start: 2020-07-14

## 2020-07-14 RX ORDER — SODIUM CHLORIDE 0.9 % (FLUSH) 0.9 %
10 SYRINGE (ML) INJECTION EVERY 12 HOURS SCHEDULED
Status: CANCELLED | OUTPATIENT
Start: 2020-07-14

## 2020-07-15 ENCOUNTER — TELEPHONE (OUTPATIENT)
Dept: PRIMARY CARE CLINIC | Age: 35
End: 2020-07-15

## 2020-07-17 ENCOUNTER — HOSPITAL ENCOUNTER (OUTPATIENT)
Age: 35
Setting detail: OUTPATIENT SURGERY
Discharge: HOME | End: 2020-07-17
Attending: PAIN MEDICINE | Admitting: PAIN MEDICINE
Payer: MEDICARE

## 2020-07-17 ENCOUNTER — APPOINTMENT (OUTPATIENT)
Dept: GENERAL RADIOLOGY | Age: 35
End: 2020-07-17
Attending: PAIN MEDICINE
Payer: MEDICARE

## 2020-07-17 ENCOUNTER — ANESTHESIA (OUTPATIENT)
Dept: OPERATING ROOM | Age: 35
End: 2020-07-17
Payer: MEDICARE

## 2020-07-17 VITALS
SYSTOLIC BLOOD PRESSURE: 166 MMHG | OXYGEN SATURATION: 96 % | TEMPERATURE: 98 F | WEIGHT: 250 LBS | DIASTOLIC BLOOD PRESSURE: 91 MMHG | BODY MASS INDEX: 39.24 KG/M2 | HEIGHT: 67 IN | HEART RATE: 72 BPM | RESPIRATION RATE: 20 BRPM

## 2020-07-17 VITALS
OXYGEN SATURATION: 100 % | DIASTOLIC BLOOD PRESSURE: 87 MMHG | SYSTOLIC BLOOD PRESSURE: 140 MMHG | RESPIRATION RATE: 21 BRPM

## 2020-07-17 PROCEDURE — 2709999900 HC NON-CHARGEABLE SUPPLY: Performed by: PAIN MEDICINE

## 2020-07-17 PROCEDURE — 6360000002 HC RX W HCPCS: Performed by: PAIN MEDICINE

## 2020-07-17 PROCEDURE — 3209999900 FLUORO FOR SURGICAL PROCEDURES

## 2020-07-17 PROCEDURE — 64484 NJX AA&/STRD TFRM EPI L/S EA: CPT | Performed by: PAIN MEDICINE

## 2020-07-17 PROCEDURE — 3700000000 HC ANESTHESIA ATTENDED CARE: Performed by: PAIN MEDICINE

## 2020-07-17 PROCEDURE — 64483 NJX AA&/STRD TFRM EPI L/S 1: CPT | Performed by: PAIN MEDICINE

## 2020-07-17 PROCEDURE — 3600000002 HC SURGERY LEVEL 2 BASE: Performed by: PAIN MEDICINE

## 2020-07-17 PROCEDURE — 6360000004 HC RX CONTRAST MEDICATION: Performed by: PAIN MEDICINE

## 2020-07-17 PROCEDURE — 6360000002 HC RX W HCPCS: Performed by: NURSE ANESTHETIST, CERTIFIED REGISTERED

## 2020-07-17 PROCEDURE — 7100000011 HC PHASE II RECOVERY - ADDTL 15 MIN: Performed by: PAIN MEDICINE

## 2020-07-17 PROCEDURE — 7100000010 HC PHASE II RECOVERY - FIRST 15 MIN: Performed by: PAIN MEDICINE

## 2020-07-17 RX ORDER — FENTANYL CITRATE 50 UG/ML
INJECTION, SOLUTION INTRAMUSCULAR; INTRAVENOUS PRN
Status: DISCONTINUED | OUTPATIENT
Start: 2020-07-17 | End: 2020-07-17 | Stop reason: SDUPTHER

## 2020-07-17 RX ORDER — DEXAMETHASONE SODIUM PHOSPHATE 10 MG/ML
INJECTION INTRAMUSCULAR; INTRAVENOUS PRN
Status: DISCONTINUED | OUTPATIENT
Start: 2020-07-17 | End: 2020-07-17 | Stop reason: ALTCHOICE

## 2020-07-17 RX ORDER — MORPHINE SULFATE 1 MG/ML
1 INJECTION, SOLUTION EPIDURAL; INTRATHECAL; INTRAVENOUS EVERY 5 MIN PRN
Status: DISCONTINUED | OUTPATIENT
Start: 2020-07-17 | End: 2020-07-17 | Stop reason: HOSPADM

## 2020-07-17 RX ORDER — ONDANSETRON 2 MG/ML
4 INJECTION INTRAMUSCULAR; INTRAVENOUS
Status: DISCONTINUED | OUTPATIENT
Start: 2020-07-17 | End: 2020-07-17 | Stop reason: HOSPADM

## 2020-07-17 RX ORDER — PROMETHAZINE HYDROCHLORIDE 25 MG/ML
6.25 INJECTION, SOLUTION INTRAMUSCULAR; INTRAVENOUS
Status: DISCONTINUED | OUTPATIENT
Start: 2020-07-17 | End: 2020-07-17 | Stop reason: HOSPADM

## 2020-07-17 RX ORDER — FENTANYL CITRATE 50 UG/ML
25 INJECTION, SOLUTION INTRAMUSCULAR; INTRAVENOUS EVERY 5 MIN PRN
Status: DISCONTINUED | OUTPATIENT
Start: 2020-07-17 | End: 2020-07-17 | Stop reason: HOSPADM

## 2020-07-17 RX ORDER — HYDROCODONE BITARTRATE AND ACETAMINOPHEN 5; 325 MG/1; MG/1
2 TABLET ORAL PRN
Status: DISCONTINUED | OUTPATIENT
Start: 2020-07-17 | End: 2020-07-17 | Stop reason: HOSPADM

## 2020-07-17 RX ORDER — HYDRALAZINE HYDROCHLORIDE 20 MG/ML
5 INJECTION INTRAMUSCULAR; INTRAVENOUS EVERY 10 MIN PRN
Status: DISCONTINUED | OUTPATIENT
Start: 2020-07-17 | End: 2020-07-17 | Stop reason: HOSPADM

## 2020-07-17 RX ORDER — DIPHENHYDRAMINE HYDROCHLORIDE 50 MG/ML
12.5 INJECTION INTRAMUSCULAR; INTRAVENOUS
Status: DISCONTINUED | OUTPATIENT
Start: 2020-07-17 | End: 2020-07-17 | Stop reason: HOSPADM

## 2020-07-17 RX ORDER — HYDROCODONE BITARTRATE AND ACETAMINOPHEN 5; 325 MG/1; MG/1
1 TABLET ORAL PRN
Status: DISCONTINUED | OUTPATIENT
Start: 2020-07-17 | End: 2020-07-17 | Stop reason: HOSPADM

## 2020-07-17 RX ORDER — MIDAZOLAM HYDROCHLORIDE 1 MG/ML
INJECTION INTRAMUSCULAR; INTRAVENOUS PRN
Status: DISCONTINUED | OUTPATIENT
Start: 2020-07-17 | End: 2020-07-17 | Stop reason: SDUPTHER

## 2020-07-17 RX ADMIN — FENTANYL CITRATE 100 MCG: 50 INJECTION INTRAMUSCULAR; INTRAVENOUS at 08:32

## 2020-07-17 RX ADMIN — MIDAZOLAM HYDROCHLORIDE 2 MG: 1 INJECTION, SOLUTION INTRAMUSCULAR; INTRAVENOUS at 08:30

## 2020-07-17 ASSESSMENT — PULMONARY FUNCTION TESTS
PIF_VALUE: 0

## 2020-07-17 ASSESSMENT — PAIN - FUNCTIONAL ASSESSMENT: PAIN_FUNCTIONAL_ASSESSMENT: 0-10

## 2020-07-17 ASSESSMENT — PAIN SCALES - GENERAL
PAINLEVEL_OUTOF10: 0
PAINLEVEL_OUTOF10: 0

## 2020-07-17 ASSESSMENT — LIFESTYLE VARIABLES: SMOKING_STATUS: 1

## 2020-07-17 ASSESSMENT — PAIN DESCRIPTION - DESCRIPTORS: DESCRIPTORS: ACHING

## 2020-07-17 NOTE — OP NOTE
Transforaminal Epidural Steroid Injection:  SURGEON: Kay Lopez    PRE-OP DIAGNOSIS: M54.17 (lumbosacral neuritis), M54.5 (low back pain)    POST-OP DIAGNOSIS: Same. PROCEDURE PERFORMED:  Left L3 and L4 Lumbar Transforaminal JODY selective nerve root block. Physician confirmed and marked the surgical site. EBL: minimal    CONSENT: Patient has undergone the educational process with this procedure, is aware and fully understands the risks involved: potential damage to any and all body organs including possible bleeding, infection, and nerve injury, allergic reaction and headache. Patient also understands that the procedure will be undertaken in a safe, controlled and monitored setting. Patient recognizes that the benefits may include relief from pain and reduction in the oral use of medications. Patient agreed to proceed. The patient was counseled at length about the risks of sukhi Covid-19 during their perioperative period and any recovery window from their procedure. The patient was made aware that sukhi Covid-19  may worsen their prognosis for recovering from their procedure  and lend to a higher morbidity and/or mortality risk. All material risks, benefits, and reasonable alternatives including postponing the procedure were discussed. The patient does wish to proceed with the procedure at this time. PREP: The patient was placed in the prone position and padded appropriately. The injection site was prepped with chloroprep and draped appropriately. 5ml of 0.5% lidocaine was used to anesthetize the skin and subcutaneous tissue. PROCEDURE NOTE: A 22 gauge 3.5 inch Pencil-Point needle was then advanced to the Left L3 and L4 neuroforamen using a wide paramedian approach under fluoroscopic guidance. Aspiration was negative for blood, CSF and producing pain.  Contrast Medium: 1 ml of (omnipaque) contrast was then injected and the following was noted: appropriate spread of contrast along the nerve root sheath and adjacent epidural space 4mg Dexamethasone mixed with 1.5 ml of 0.5 % lidocaine was then injected into the nerve root sheath of each of the indicated levels. The needle was withdrawn by the physician and the nurse applied a sterile dressing. The patient tolerated the procedure well. No complications occured. Patient transferred to the recovery room in satisfatory condition. Appropriate written discharge instructions given to the patient.     [address] [place] [address]

## 2020-07-17 NOTE — H&P
Update History & Physical    The patient's History and Physical of June 18, 2020 was reviewed with the patient and I examined the patient. There was no change. The surgical site was confirmed by the patient and me. Plan: The risks, benefits, expected outcome, and alternative to the recommended procedure have been discussed with the patient. Patient understands and wants to proceed with the procedure. Electronically signed by Harinder Villarreal MD on 7/17/2020 at 8:19 AM        HPI:      Back Pain   This is a chronic problem. The current episode started more than 1 year ago. The problem occurs constantly. The problem has been gradually worsening since onset. The pain is present in the lumbar spine, sacro-iliac and gluteal. The quality of the pain is described as aching, cramping, burning, shooting and stabbing. Radiates to: down bilateral leg, primarily the left leg. The pain is at a severity of 8/10. The pain is moderate. The pain is the same all the time. The symptoms are aggravated by sitting, coughing, lying down, bending, position, stress, twisting and standing (walking). Stiffness is present all day. Associated symptoms include abdominal pain, dysuria, leg pain, numbness, tingling and weakness. Pertinent negatives include no bladder incontinence, bowel incontinence, chest pain or fever. He has tried ice, heat, NSAIDs, bed rest, walking and chiropractic manipulation (physical therapy) for the symptoms. The treatment provided mild relief.      Pain in the low back down the left leg. MRI with disc bulging degenerative changes. He has done physical therapy without benefit. He has seen a surgeon, nothing surgical at this time. Neurontin with some benefit.     Patient denies any new neurological symptoms. No bowel or bladder incontinence, no weakness, and no falling.     Review of OARRS does not show any aberrant prescription behavior.      Past Medical History   No past medical history on file.    Past Surgical History         Past Surgical History:   Procedure Laterality Date    CYST REMOVAL        FEMUR FRACTURE SURGERY               No Known Allergies     Current Medication     Current Outpatient Medications:     gabapentin (NEURONTIN) 300 MG capsule, Take 1 capsule by mouth 3 times daily for 90 days. , Disp: 90 capsule, Rfl: 0    traMADol (ULTRAM) 50 MG tablet, Take 1 tablet by mouth 2 times daily as needed for Pain. Take lowest dose possible to manage pain, Disp: 60 tablet, Rfl: 0    gabapentin (NEURONTIN) 400 MG capsule, Take 1 capsule by mouth 3 times daily for 90 days. , Disp: 270 capsule, Rfl: 0    permethrin (NIX) 1 % liquid, Apply daily (Patient not taking: Reported on 6/18/2020), Disp: 120 mL, Rfl: 1        Family History         Family History   Problem Relation Age of Onset    Diabetes Father           Type 1    Heart Disease Maternal Grandmother      Diabetes Maternal Grandfather             Social History               Socioeconomic History    Marital status:        Spouse name: Not on file    Number of children: Not on file    Years of education: Not on file    Highest education level: Not on file   Occupational History    Not on file   Social Needs    Financial resource strain: Not on file    Food insecurity       Worry: Not on file       Inability: Not on file    Transportation needs       Medical: Not on file       Non-medical: Not on file   Tobacco Use    Smoking status: Current Every Day Smoker       Packs/day: 1.00       Years: 20.00       Pack years: 20.00       Types: Cigarettes       Start date: 8/24/1995    Smokeless tobacco: Never Used   Substance and Sexual Activity    Alcohol use:  Yes       Alcohol/week: 0.0 standard drinks       Comment: social    Drug use: No    Sexual activity: Not on file   Lifestyle    Physical activity       Days per week: Not on file       Minutes per session: Not on file    Stress: Not on file   Relationships    Social connections       Talks on phone: Not on file       Gets together: Not on file       Attends Yazidism service: Not on file       Active member of club or organization: Not on file       Attends meetings of clubs or organizations: Not on file       Relationship status: Not on file    Intimate partner violence       Fear of current or ex partner: Not on file       Emotionally abused: Not on file       Physically abused: Not on file       Forced sexual activity: Not on file   Other Topics Concern    Not on file   Social History Narrative    Not on file           Review of Systems:  Review of Systems   Constitution: Negative. Negative for fever. HENT: Negative. Cardiovascular: Negative. Negative for chest pain. Respiratory: Positive for cough, shortness of breath and wheezing. Skin: Negative. Musculoskeletal: Positive for back pain. Gastrointestinal: Positive for abdominal pain. Negative for bowel incontinence. Genitourinary: Positive for dysuria and frequency. Negative for bladder incontinence. Neurological: Positive for numbness, tingling and weakness. Psychiatric/Behavioral: Negative. Allergic/Immunologic: Negative.          Physical Exam:  BP (!) 139/98   Pulse 79   Temp 98.4 °F (36.9 °C) (Oral)   Ht 5' 7\" (1.702 m)   Wt 250 lb 3.2 oz (113.5 kg)   BMI 39.19 kg/m²      Physical Exam  Vitals signs reviewed. Constitutional:       General: He is awake. Appearance: He is not ill-appearing or diaphoretic. HENT:      Right Ear: External ear normal.      Left Ear: External ear normal.   Eyes:      General:         Right eye: No discharge. Left eye: No discharge. Conjunctiva/sclera: Conjunctivae normal.   Neck:      Thyroid: No thyromegaly. Trachea: No tracheal deviation. Pulmonary:      Effort: Pulmonary effort is normal. No respiratory distress. Breath sounds: No stridor. Musculoskeletal:      Lumbar back: He exhibits tenderness.  He exhibits no edema and no deformity. Skin:     General: Skin is warm and dry. Neurological:      Mental Status: He is alert and oriented to person, place, and time. Gait: Gait normal.      Deep Tendon Reflexes: Reflexes are normal and symmetric. Psychiatric:         Attention and Perception: Attention and perception normal.         Behavior: Behavior normal.            Record/Diagnostics Review:    As above, I did review the imaging     Assessment:  1. Lumbar radiculopathy    2. DDD (degenerative disc disease), lumbar    3. Lumbosacral spondylosis without myelopathy    4. Disc displacement, lumbar         Treatment Plan:  DISCUSSION: Treatment options discussed with patient and all questions answered to patient's satisfaction.     OARRS Review: Reviewed and acceptable for medications prescribed. TREATMENT OPTIONS:      Discussed different treatment options including continued conservative care such as physical therapy, chiropractic care, acupuncture. Discussed different interventional options such as epidural steroids or medial branch blocks. Also discussed surgical evaluation.     At this point he wishes to try injections. Discussed the importance of continued physical therapy and exercise program as well. Pain in the low back and legs continues despite conservative measures, may benefit from epidural steroid injections, we'll try the Left L3 and L4 transforaminal approach, also consider left L4 and L5 transforaminal approach.   Consider medial branch blocks in the future as well.              Lanie Virk M.D.

## 2020-07-17 NOTE — ANESTHESIA POSTPROCEDURE EVALUATION
POST- ANESTHESIA EVALUATION       Pt Name: Davoin Monique  MRN: [id number]  YOB: 1985  Date of evaluation: 7/17/2020  Time:  9:15 AM      BP (!) 138/94   Pulse 105   Temp 98 °F (36.7 °C) (Temporal)   Resp 20   Ht 5' 7\" (1.702 m)   Wt 250 lb (113.4 kg)   SpO2 100%   BMI 39.16 kg/m²      Consciousness Level  Awake  Cardiopulmonary Status  Stable  Pain Adequately Treated YES  Nausea / Vomiting  NO  Adequate Hydration  YES  Anesthesia Related Complications NONE      Electronically signed by Keyonna Emmanuel MD on 7/17/2020 at 9:15 AM       Department of Anesthesiology  Postprocedure Note    Patient: Davion Monique  MRN: [id number]  YOB: 1985  Date of evaluation: 7/17/2020  Time:  9:15 AM     Procedure Summary     Date:  07/17/20 Room / Location:  [address] [place] [address]    Anesthesia Start:  0830 Anesthesia Stop:  3371    Procedure:  LUMBAR TRANSFORAMINAL # 1 L3 & L4 (Left ) Diagnosis:  (RADICULOPATHY)    Surgeon:  Alexa Russell MD Responsible Provider:  MINA Medeiros CRNA    Anesthesia Type:  MAC ASA Status:  3          Anesthesia Type: MAC    Marshall Phase I: Marshall Score: 10    Marshall Phase II: Marshall Score: 10    Last vitals: Reviewed and per EMR flowsheets.        Anesthesia Post Evaluation

## 2020-07-17 NOTE — ANESTHESIA PRE PROCEDURE
Department of Anesthesiology  Preprocedure Note       Name:  Faraz Mclean   Age:  37 y.o.  :  1985                                          MRN:  [id number]         Date:  2020      Surgeon: Mandi Barber):  Bob Pulido MD    Procedure: Procedure(s):  LUMBAR TRANSFORAMINAL # 1 L3 & L4    Medications prior to admission:   Prior to Admission medications    Medication Sig Start Date End Date Taking? Authorizing Provider   traMADol (ULTRAM) 50 MG tablet Take 1 tablet by mouth 2 times daily as needed for Pain. Take lowest dose possible to manage pain 20  MINA Hull - NP   permethrin (NIX) 1 % liquid Apply daily  Patient not taking: Reported on 2020 6/10/20 6/9/21  MINA Hull - NP   gabapentin (NEURONTIN) 300 MG capsule Take 1 capsule by mouth 3 times daily for 90 days. 20  MINA Hull - NP   gabapentin (NEURONTIN) 400 MG capsule Take 1 capsule by mouth 3 times daily for 90 days. 20  MINA Carreon CNP       Current medications:    No current facility-administered medications for this encounter. Allergies:  No Known Allergies    Problem List:    Patient Active Problem List   Diagnosis Code    Tobacco abuse Z72.0    Other anxiety states F41.1    Sciatica M54.30    Lipoma of back D17.1    Mild persistent asthma J45.30    Unruptured synovial cyst of left popliteal space M71.22       Past Medical History:  History reviewed. No pertinent past medical history. Past Surgical History:        Procedure Laterality Date    CYST REMOVAL      FEMUR FRACTURE SURGERY         Social History:    Social History     Tobacco Use    Smoking status: Current Every Day Smoker     Packs/day: 1.00     Years: 20.00     Pack years: 20.00     Types: Cigarettes     Start date: 1995    Smokeless tobacco: Never Used   Substance Use Topics    Alcohol use:  Yes     Alcohol/week: 0.0 standard drinks     Comment: social Ready to quit: Not Answered  Counseling given: Not Answered      Vital Signs (Current): There were no vitals filed for this visit. BP Readings from Last 3 Encounters:   06/18/20 (!) 139/98   02/24/20 (!) 145/92   01/16/20 (!) 150/98       NPO Status:                                                                                 BMI:   Wt Readings from Last 3 Encounters:   06/18/20 250 lb 3.2 oz (113.5 kg)   02/24/20 248 lb 12.8 oz (112.9 kg)   01/07/20 237 lb 8 oz (107.7 kg)     There is no height or weight on file to calculate BMI.    CBC: No results found for: WBC, RBC, HGB, HCT, MCV, RDW, PLT    CMP:   Lab Results   Component Value Date     03/03/2017    K 4.1 03/03/2017     03/03/2017    CO2 25 03/03/2017    BUN 9 03/03/2017    CREATININE 1.01 03/03/2017    GFRAA >60 03/03/2017    LABGLOM >60 03/03/2017    GLUCOSE 93 03/03/2017    PROT 6.5 03/03/2017    CALCIUM 8.5 03/03/2017    BILITOT <0.10 03/03/2017    ALKPHOS 90 03/03/2017    AST 18 03/03/2017    ALT 23 03/03/2017       POC Tests: No results for input(s): POCGLU, POCNA, POCK, POCCL, POCBUN, POCHEMO, POCHCT in the last 72 hours.     Coags: No results found for: PROTIME, INR, APTT    HCG (If Applicable): No results found for: PREGTESTUR, PREGSERUM, HCG, HCGQUANT     ABGs: No results found for: PHART, PO2ART, WIG2GLO, SZA6ARG, BEART, K0YSIRDU     Type & Screen (If Applicable):  No results found for: LABABO, LABRH    Drug/Infectious Status (If Applicable):  No results found for: HIV, HEPCAB    COVID-19 Screening (If Applicable):   Lab Results   Component Value Date    COVID19 Not Detected 07/13/2020         Anesthesia Evaluation  Patient summary reviewed and Nursing notes reviewed no history of anesthetic complications:   Airway: Mallampati: II  TM distance: >3 FB   Neck ROM: full  Mouth opening: > = 3 FB Dental: normal exam         Pulmonary:normal exam  breath sounds clear to auscultation  (+) asthma: current smoker          Patient smoked on day of surgery. Cardiovascular:Negative CV ROS  Exercise tolerance: no interval change,           Rhythm: regular  Rate: normal                    Neuro/Psych:   (+) neuromuscular disease:, depression/anxiety              ROS comment: Sciatica , RADICULOPATHY  GI/Hepatic/Renal:            ROS comment: obesity. Endo/Other: Negative Endo/Other ROS                    Abdominal:   (+) obese,         Vascular: negative vascular ROS. Anesthesia Plan      MAC     ASA 2             Anesthetic plan and risks discussed with patient. Plan discussed with CRNA.                 Queen Truong MD   7/17/2020

## 2020-07-27 ENCOUNTER — ANESTHESIA EVENT (OUTPATIENT)
Dept: OPERATING ROOM | Age: 35
End: 2020-07-27
Payer: MEDICARE

## 2020-07-27 ENCOUNTER — HOSPITAL ENCOUNTER (OUTPATIENT)
Dept: PREADMISSION TESTING | Age: 35
Setting detail: SPECIMEN
Discharge: HOME | End: 2020-07-31
Payer: MEDICARE

## 2020-07-27 PROCEDURE — U0003 INFECTIOUS AGENT DETECTION BY NUCLEIC ACID (DNA OR RNA); SEVERE ACUTE RESPIRATORY SYNDROME CORONAVIRUS 2 (SARS-COV-2) (CORONAVIRUS DISEASE [COVID-19]), AMPLIFIED PROBE TECHNIQUE, MAKING USE OF HIGH THROUGHPUT TECHNOLOGIES AS DESCRIBED BY CMS-2020-01-R: HCPCS

## 2020-07-29 LAB — SARS-COV-2, NAA: NOT DETECTED

## 2020-07-30 ENCOUNTER — TELEPHONE (OUTPATIENT)
Dept: PRIMARY CARE CLINIC | Age: 35
End: 2020-07-30

## 2020-07-31 ENCOUNTER — ANESTHESIA (OUTPATIENT)
Dept: OPERATING ROOM | Age: 35
End: 2020-07-31
Payer: MEDICARE

## 2020-07-31 ENCOUNTER — APPOINTMENT (OUTPATIENT)
Dept: GENERAL RADIOLOGY | Age: 35
End: 2020-07-31
Attending: PAIN MEDICINE
Payer: MEDICARE

## 2020-07-31 ENCOUNTER — HOSPITAL ENCOUNTER (OUTPATIENT)
Age: 35
Setting detail: OUTPATIENT SURGERY
Discharge: HOME | End: 2020-07-31
Attending: PAIN MEDICINE | Admitting: PAIN MEDICINE
Payer: MEDICARE

## 2020-07-31 VITALS
RESPIRATION RATE: 16 BRPM | DIASTOLIC BLOOD PRESSURE: 81 MMHG | OXYGEN SATURATION: 98 % | SYSTOLIC BLOOD PRESSURE: 129 MMHG

## 2020-07-31 VITALS
DIASTOLIC BLOOD PRESSURE: 96 MMHG | OXYGEN SATURATION: 97 % | BODY MASS INDEX: 40.15 KG/M2 | HEIGHT: 67 IN | HEART RATE: 66 BPM | RESPIRATION RATE: 18 BRPM | WEIGHT: 255.8 LBS | TEMPERATURE: 98.4 F | SYSTOLIC BLOOD PRESSURE: 129 MMHG

## 2020-07-31 PROCEDURE — 64484 NJX AA&/STRD TFRM EPI L/S EA: CPT | Performed by: PAIN MEDICINE

## 2020-07-31 PROCEDURE — 7100000011 HC PHASE II RECOVERY - ADDTL 15 MIN: Performed by: PAIN MEDICINE

## 2020-07-31 PROCEDURE — 3700000001 HC ADD 15 MINUTES (ANESTHESIA): Performed by: PAIN MEDICINE

## 2020-07-31 PROCEDURE — 6360000002 HC RX W HCPCS: Performed by: NURSE ANESTHETIST, CERTIFIED REGISTERED

## 2020-07-31 PROCEDURE — 3700000000 HC ANESTHESIA ATTENDED CARE: Performed by: PAIN MEDICINE

## 2020-07-31 PROCEDURE — 3209999900 FLUORO FOR SURGICAL PROCEDURES

## 2020-07-31 PROCEDURE — 6360000002 HC RX W HCPCS: Performed by: PAIN MEDICINE

## 2020-07-31 PROCEDURE — 7100000010 HC PHASE II RECOVERY - FIRST 15 MIN: Performed by: PAIN MEDICINE

## 2020-07-31 PROCEDURE — 6360000004 HC RX CONTRAST MEDICATION: Performed by: PAIN MEDICINE

## 2020-07-31 PROCEDURE — 2709999900 HC NON-CHARGEABLE SUPPLY: Performed by: PAIN MEDICINE

## 2020-07-31 PROCEDURE — 64483 NJX AA&/STRD TFRM EPI L/S 1: CPT | Performed by: PAIN MEDICINE

## 2020-07-31 PROCEDURE — 3600000002 HC SURGERY LEVEL 2 BASE: Performed by: PAIN MEDICINE

## 2020-07-31 PROCEDURE — 3600000012 HC SURGERY LEVEL 2 ADDTL 15MIN: Performed by: PAIN MEDICINE

## 2020-07-31 RX ORDER — HYDROCODONE BITARTRATE AND ACETAMINOPHEN 5; 325 MG/1; MG/1
2 TABLET ORAL PRN
Status: DISCONTINUED | OUTPATIENT
Start: 2020-07-31 | End: 2020-07-31 | Stop reason: HOSPADM

## 2020-07-31 RX ORDER — HYDRALAZINE HYDROCHLORIDE 20 MG/ML
5 INJECTION INTRAMUSCULAR; INTRAVENOUS EVERY 10 MIN PRN
Status: DISCONTINUED | OUTPATIENT
Start: 2020-07-31 | End: 2020-07-31 | Stop reason: HOSPADM

## 2020-07-31 RX ORDER — DIPHENHYDRAMINE HYDROCHLORIDE 50 MG/ML
12.5 INJECTION INTRAMUSCULAR; INTRAVENOUS
Status: DISCONTINUED | OUTPATIENT
Start: 2020-07-31 | End: 2020-07-31 | Stop reason: HOSPADM

## 2020-07-31 RX ORDER — PROMETHAZINE HYDROCHLORIDE 25 MG/ML
6.25 INJECTION, SOLUTION INTRAMUSCULAR; INTRAVENOUS
Status: DISCONTINUED | OUTPATIENT
Start: 2020-07-31 | End: 2020-07-31 | Stop reason: HOSPADM

## 2020-07-31 RX ORDER — FENTANYL CITRATE 50 UG/ML
INJECTION, SOLUTION INTRAMUSCULAR; INTRAVENOUS PRN
Status: DISCONTINUED | OUTPATIENT
Start: 2020-07-31 | End: 2020-07-31 | Stop reason: SDUPTHER

## 2020-07-31 RX ORDER — ONDANSETRON 2 MG/ML
4 INJECTION INTRAMUSCULAR; INTRAVENOUS
Status: DISCONTINUED | OUTPATIENT
Start: 2020-07-31 | End: 2020-07-31 | Stop reason: HOSPADM

## 2020-07-31 RX ORDER — MEPERIDINE HYDROCHLORIDE 50 MG/ML
12.5 INJECTION INTRAMUSCULAR; INTRAVENOUS; SUBCUTANEOUS EVERY 5 MIN PRN
Status: DISCONTINUED | OUTPATIENT
Start: 2020-07-31 | End: 2020-07-31 | Stop reason: HOSPADM

## 2020-07-31 RX ORDER — HYDROCODONE BITARTRATE AND ACETAMINOPHEN 5; 325 MG/1; MG/1
1 TABLET ORAL PRN
Status: DISCONTINUED | OUTPATIENT
Start: 2020-07-31 | End: 2020-07-31 | Stop reason: HOSPADM

## 2020-07-31 RX ORDER — SODIUM CHLORIDE 0.9 % (FLUSH) 0.9 %
10 SYRINGE (ML) INJECTION EVERY 12 HOURS SCHEDULED
Status: DISCONTINUED | OUTPATIENT
Start: 2020-07-31 | End: 2020-07-31 | Stop reason: HOSPADM

## 2020-07-31 RX ORDER — MORPHINE SULFATE 1 MG/ML
1 INJECTION, SOLUTION EPIDURAL; INTRATHECAL; INTRAVENOUS EVERY 5 MIN PRN
Status: DISCONTINUED | OUTPATIENT
Start: 2020-07-31 | End: 2020-07-31 | Stop reason: HOSPADM

## 2020-07-31 RX ORDER — MIDAZOLAM HYDROCHLORIDE 1 MG/ML
INJECTION INTRAMUSCULAR; INTRAVENOUS PRN
Status: DISCONTINUED | OUTPATIENT
Start: 2020-07-31 | End: 2020-07-31 | Stop reason: SDUPTHER

## 2020-07-31 RX ORDER — FENTANYL CITRATE 50 UG/ML
25 INJECTION, SOLUTION INTRAMUSCULAR; INTRAVENOUS EVERY 5 MIN PRN
Status: DISCONTINUED | OUTPATIENT
Start: 2020-07-31 | End: 2020-07-31 | Stop reason: HOSPADM

## 2020-07-31 RX ORDER — DEXAMETHASONE SODIUM PHOSPHATE 10 MG/ML
INJECTION INTRAMUSCULAR; INTRAVENOUS PRN
Status: DISCONTINUED | OUTPATIENT
Start: 2020-07-31 | End: 2020-07-31 | Stop reason: ALTCHOICE

## 2020-07-31 RX ORDER — TRAMADOL HYDROCHLORIDE 50 MG/1
50 TABLET ORAL 2 TIMES DAILY PRN
Qty: 60 TABLET | Refills: 0 | Status: SHIPPED | OUTPATIENT
Start: 2020-07-31 | End: 2020-09-01 | Stop reason: SDUPTHER

## 2020-07-31 RX ORDER — SODIUM CHLORIDE 0.9 % (FLUSH) 0.9 %
10 SYRINGE (ML) INJECTION PRN
Status: DISCONTINUED | OUTPATIENT
Start: 2020-07-31 | End: 2020-07-31 | Stop reason: HOSPADM

## 2020-07-31 RX ADMIN — FENTANYL CITRATE 100 MCG: 50 INJECTION INTRAMUSCULAR; INTRAVENOUS at 07:53

## 2020-07-31 RX ADMIN — MIDAZOLAM HYDROCHLORIDE 2 MG: 1 INJECTION, SOLUTION INTRAMUSCULAR; INTRAVENOUS at 07:53

## 2020-07-31 ASSESSMENT — PULMONARY FUNCTION TESTS
PIF_VALUE: 1
PIF_VALUE: 0
PIF_VALUE: 1
PIF_VALUE: 0
PIF_VALUE: 1
PIF_VALUE: 0

## 2020-07-31 ASSESSMENT — PAIN DESCRIPTION - LOCATION
LOCATION: BACK

## 2020-07-31 ASSESSMENT — PAIN DESCRIPTION - PAIN TYPE
TYPE: CHRONIC PAIN
TYPE: CHRONIC PAIN

## 2020-07-31 ASSESSMENT — PAIN - FUNCTIONAL ASSESSMENT
PAIN_FUNCTIONAL_ASSESSMENT: PREVENTS OR INTERFERES SOME ACTIVE ACTIVITIES AND ADLS
PAIN_FUNCTIONAL_ASSESSMENT: 0-10

## 2020-07-31 ASSESSMENT — PAIN DESCRIPTION - ORIENTATION
ORIENTATION: LOWER
ORIENTATION: LOWER;LEFT

## 2020-07-31 ASSESSMENT — PAIN DESCRIPTION - DESCRIPTORS
DESCRIPTORS: ACHING;THROBBING;DULL
DESCRIPTORS: ACHING

## 2020-07-31 ASSESSMENT — PAIN SCALES - GENERAL
PAINLEVEL_OUTOF10: 6
PAINLEVEL_OUTOF10: 2
PAINLEVEL_OUTOF10: 2
PAINLEVEL_OUTOF10: 6

## 2020-07-31 ASSESSMENT — PAIN DESCRIPTION - ONSET: ONSET: ON-GOING

## 2020-07-31 ASSESSMENT — LIFESTYLE VARIABLES: SMOKING_STATUS: 1

## 2020-07-31 NOTE — OP NOTE
Transforaminal Epidural Steroid Injection:  SURGEON: Kay Lopez    PRE-OP DIAGNOSIS: M54.17 (lumbosacral neuritis), M54.5 (low back pain)    POST-OP DIAGNOSIS: Same. PROCEDURE PERFORMED:  Left L3 and L4 Lumbar Transforaminal JODY selective nerve root block. Physician confirmed and marked the surgical site. EBL: minimal    CONSENT: Patient has undergone the educational process with this procedure, is aware and fully understands the risks involved: potential damage to any and all body organs including possible bleeding, infection, and nerve injury, allergic reaction and headache. Patient also understands that the procedure will be undertaken in a safe, controlled and monitored setting. Patient recognizes that the benefits may include relief from pain and reduction in the oral use of medications. Patient agreed to proceed. The patient was counseled at length about the risks of sukhi Covid-19 during their perioperative period and any recovery window from their procedure. The patient was made aware that sukhi Covid-19  may worsen their prognosis for recovering from their procedure  and lend to a higher morbidity and/or mortality risk. All material risks, benefits, and reasonable alternatives including postponing the procedure were discussed. The patient does wish to proceed with the procedure at this time. PREP: The patient was placed in the prone position and padded appropriately. The injection site was prepped with chloroprep and draped appropriately. 5ml of 0.5% lidocaine was used to anesthetize the skin and subcutaneous tissue. PROCEDURE NOTE: A 22 gauge 3.5 inch Pencil-Point needle was then advanced to the Left L3 and L4 neuroforamen using a wide paramedian approach under fluoroscopic guidance. Aspiration was negative for blood, CSF and producing pain.  Contrast Medium: 1 ml of (omnipaque) contrast was then injected and the following was noted: appropriate spread of contrast along the nerve root sheath and adjacent epidural space 4mg Dexamethasone mixed with 1.5 ml of 0.5 % lidocaine was then injected into the nerve root sheath of each of the indicated levels. The needle was withdrawn by the physician and the nurse applied a sterile dressing. The patient tolerated the procedure well. No complications occured. Patient transferred to the recovery room in satisfatory condition. Appropriate written discharge instructions given to the patient.     [address] [place] [address]

## 2020-07-31 NOTE — H&P
Pain Pre-Op H&P Note    Suzan Lopez    HPI: Britt Melara  presents with low back and leg pain. History reviewed. No pertinent past medical history.     Past Surgical History:   Procedure Laterality Date    CYST REMOVAL      FEMUR FRACTURE SURGERY      NERVE BLOCK  07/17/2020    Procedure: LUMBAR TRANSFORAMINAL # 1 L3 & L4 (Left )     PAIN MANAGEMENT PROCEDURE Left 7/17/2020    LUMBAR TRANSFORAMINAL # 1 L3 & L4 performed by [address], [place] at [address] [facility].       Family History   Problem Relation Age of Onset    Diabetes Father         Type 1    Heart Disease Maternal Grandmother     Diabetes Maternal Grandfather        No Known Allergies      Current Facility-Administered Medications:     sodium chloride flush 0.9 % injection 10 mL, 10 mL, Intravenous, 2 times per day, Luis Parker MD    sodium chloride flush 0.9 % injection 10 mL, 10 mL, Intravenous, PRN, Luis Parker MD    morphine (PF) injection 1 mg, 1 mg, Intravenous, Q5 Min PRN, Luis Parker MD    HYDROmorphone (DILAUDID) injection 0.5 mg, 0.5 mg, Intravenous, Q5 Min PRN, Luis Parker MD    fentaNYL (SUBLIMAZE) injection 25 mcg, 25 mcg, Intravenous, Q5 Min PRN, Luis Parker MD    HYDROcodone-acetaminophen (NORCO) 5-325 MG per tablet 1 tablet, 1 tablet, Oral, PRN **OR** HYDROcodone-acetaminophen (NORCO) 5-325 MG per tablet 2 tablet, 2 tablet, Oral, PRN, Luis Parker MD    diphenhydrAMINE (BENADRYL) injection 12.5 mg, 12.5 mg, Intravenous, Once PRN, Luis Parker MD    ondansetron [facility]) injection 4 mg, 4 mg, Intravenous, Once PRN, Luis Parker MD    promethazine (PHENERGAN) injection 6.25 mg, 6.25 mg, Intramuscular, Once PRN, Luis Parker MD    hydrALAZINE (APRESOLINE) injection 5 mg, 5 mg, Intravenous, Q10 Min PRN, Luis Parker MD    meperidine (DEMEROL) injection 12.5 mg, 12.5 mg, Intravenous, Q5 Min PRN, Luis Parker MD    Social History     Tobacco Use    Smoking status: Current Every Day Smoker     Packs/day: 1.00     Years: 20.00     Pack years: 20.00     Types:

## 2020-07-31 NOTE — ANESTHESIA PRE PROCEDURE
Department of Anesthesiology  Preprocedure Note       Name:  Parish Shabazz   Age:  37 y.o.  :  1985                                          MRN:  [id number]         Date:  2020      Surgeon: Lena Bruno):  Campos Ty MD    Procedure: Procedure(s):  LUMBAR TRANSFORAMINAL - #2 L3-L4 LEFT    Medications prior to admission:   Prior to Admission medications    Medication Sig Start Date End Date Taking? Authorizing Provider   traMADol (ULTRAM) 50 MG tablet Take 1 tablet by mouth 2 times daily as needed for Pain. Take lowest dose possible to manage pain 20 Yes MINA Potter NP   gabapentin (NEURONTIN) 300 MG capsule Take 1 capsule by mouth 3 times daily for 90 days. 20 Yes MINA Potter NP   gabapentin (NEURONTIN) 400 MG capsule Take 1 capsule by mouth 3 times daily for 90 days. 20  MINA Warner CNP       Current medications:    Current Facility-Administered Medications   Medication Dose Route Frequency Provider Last Rate Last Dose    sodium chloride flush 0.9 % injection 10 mL  10 mL Intravenous 2 times per day Leila Collier MD        sodium chloride flush 0.9 % injection 10 mL  10 mL Intravenous PRN Leila Collier MD           Allergies:  No Known Allergies    Problem List:    Patient Active Problem List   Diagnosis Code    Tobacco abuse Z72.0    Other anxiety states F41.1    Sciatica M54.30    Lipoma of back D17.1    Mild persistent asthma J45.30    Unruptured synovial cyst of left popliteal space M71.22       Past Medical History:  History reviewed. No pertinent past medical history.     Past Surgical History:        Procedure Laterality Date    CYST REMOVAL      FEMUR FRACTURE SURGERY      NERVE BLOCK  2020    Procedure: LUMBAR TRANSFORAMINAL # 1 L3 & L4 (Left )     PAIN MANAGEMENT PROCEDURE Left 2020    LUMBAR TRANSFORAMINAL # 1 L3 & L4 performed by Campos Ty MD at [address] [place] [address] History:    Social History     Tobacco Use    Smoking status: Current Every Day Smoker     Packs/day: 1.00     Years: 20.00     Pack years: 20.00     Types: Cigarettes     Start date: 8/24/1995    Smokeless tobacco: Never Used   Substance Use Topics    Alcohol use: Yes     Alcohol/week: 0.0 standard drinks     Comment: social                                Ready to quit: Not Answered  Counseling given: Not Answered      Vital Signs (Current):   Vitals:    07/31/20 0653   BP: (!) 149/89   Pulse: 60   Resp: 18   Temp: 97.5 °F (36.4 °C)   TempSrc: Temporal   SpO2: 97%   Weight: 255 lb 12.8 oz (116 kg)   Height: 5' 7\" (1.702 m)                                              BP Readings from Last 3 Encounters:   07/31/20 (!) 149/89   07/17/20 (!) 140/87   07/17/20 (!) 166/91       NPO Status: Time of last liquid consumption: 0600(sip black coffee)                        Time of last solid consumption: 2000                        Date of last liquid consumption: 07/31/20                        Date of last solid food consumption: 07/30/20    BMI:   Wt Readings from Last 3 Encounters:   07/31/20 255 lb 12.8 oz (116 kg)   07/17/20 250 lb (113.4 kg)   06/18/20 250 lb 3.2 oz (113.5 kg)     Body mass index is 40.06 kg/m². CBC: No results found for: WBC, RBC, HGB, HCT, MCV, RDW, PLT    CMP:   Lab Results   Component Value Date     03/03/2017    K 4.1 03/03/2017     03/03/2017    CO2 25 03/03/2017    BUN 9 03/03/2017    CREATININE 1.01 03/03/2017    GFRAA >60 03/03/2017    LABGLOM >60 03/03/2017    GLUCOSE 93 03/03/2017    PROT 6.5 03/03/2017    CALCIUM 8.5 03/03/2017    BILITOT <0.10 03/03/2017    ALKPHOS 90 03/03/2017    AST 18 03/03/2017    ALT 23 03/03/2017       POC Tests: No results for input(s): POCGLU, POCNA, POCK, POCCL, POCBUN, POCHEMO, POCHCT in the last 72 hours.     Coags: No results found for: PROTIME, INR, APTT    HCG (If Applicable): No results found for: PREGTESTUR, PREGSERUM, HCG, HCGQUANT     ABGs: No results found for: PHART, PO2ART, XPO7DYV, BLR8TKC, BEART, O0FWTMEX     Type & Screen (If Applicable):  No results found for: LABABO, LABRH    Drug/Infectious Status (If Applicable):  No results found for: HIV, HEPCAB    COVID-19 Screening (If Applicable):   Lab Results   Component Value Date    COVID19 Not Detected 07/27/2020    COVID19 Not Detected 07/13/2020         Anesthesia Evaluation  Patient summary reviewed and Nursing notes reviewed no history of anesthetic complications:   Airway: Mallampati: II  TM distance: >3 FB   Neck ROM: full  Mouth opening: > = 3 FB Dental: normal exam         Pulmonary:normal exam  breath sounds clear to auscultation  (+) asthma: current smoker          Patient smoked on day of surgery. Cardiovascular:  Exercise tolerance: no interval change,           Rhythm: regular  Rate: normal                    Neuro/Psych:   (+) neuromuscular disease:, depression/anxiety             GI/Hepatic/Renal:   (+) morbid obesity          Endo/Other: Negative Endo/Other ROS                    Abdominal:   (+) obese,         Vascular: negative vascular ROS. Anesthesia Plan      MAC     ASA 3             Anesthetic plan and risks discussed with patient. Plan discussed with CRNA.                   Luis Parker MD   7/31/2020

## 2020-08-14 ENCOUNTER — TELEPHONE (OUTPATIENT)
Dept: PAIN MANAGEMENT | Age: 35
End: 2020-08-14

## 2020-09-01 RX ORDER — TRAMADOL HYDROCHLORIDE 50 MG/1
50 TABLET ORAL 2 TIMES DAILY PRN
Qty: 60 TABLET | Refills: 0 | Status: SHIPPED | OUTPATIENT
Start: 2020-09-01 | End: 2020-09-28 | Stop reason: SDUPTHER

## 2020-09-01 NOTE — TELEPHONE ENCOUNTER
Last OARRS Review-05/29/2020  Last Office Visit-01/07/2020   Return To Office-s/s worsen   Next Appointment Date-09/15/2020  Last Refill Date- 07/31/2020  60 tab  Number of Refills Given-     Health Maintenance   Topic Date Due    Varicella vaccine (1 of 2 - 2-dose childhood series) 09/11/1984    Pneumococcal 0-64 years Vaccine (1 of 1 - PPSV23) 09/11/1989    HIV screen  09/11/1998    Flu vaccine (1) 09/01/2020    DTaP/Tdap/Td vaccine (2 - Td) 02/19/2024    Hepatitis A vaccine  Aged Out    Hepatitis B vaccine  Aged Out    Hib vaccine  Aged Out    Meningococcal (ACWY) vaccine  Aged Out             (applicable per patient's age: Cancer Screenings, Depression Screening, Fall Risk Screening, Immunizations)    LDL Cholesterol (mg/dL)   Date Value   03/03/2017 100     AST (U/L)   Date Value   03/03/2017 18     ALT (U/L)   Date Value   03/03/2017 23     BUN (mg/dL)   Date Value   03/03/2017 9      (goal A1C is < 7)   (goal LDL is <100) need 30-50% reduction from baseline     BP Readings from Last 3 Encounters:   07/31/20 129/81   07/31/20 (!) 129/96   07/17/20 (!) 140/87    (goal /80)      All Future Testing planned in [facility]:      Next Visit Date:  Future Appointments   Date Time Provider Yari Bazan   9/15/2020  9:30 AM MINA Chauhan - JEREMY Griffith Early            Patient Active Problem List:     Tobacco abuse     Other anxiety states     Sciatica     Lipoma of back     Mild persistent asthma     Unruptured synovial cyst of left popliteal space

## 2020-09-15 ENCOUNTER — OFFICE VISIT (OUTPATIENT)
Dept: FAMILY MEDICINE CLINIC | Age: 35
End: 2020-09-15
Payer: MEDICARE

## 2020-09-15 VITALS
BODY MASS INDEX: 40.02 KG/M2 | RESPIRATION RATE: 16 BRPM | TEMPERATURE: 97.7 F | WEIGHT: 255 LBS | DIASTOLIC BLOOD PRESSURE: 80 MMHG | HEIGHT: 67 IN | HEART RATE: 66 BPM | SYSTOLIC BLOOD PRESSURE: 120 MMHG | OXYGEN SATURATION: 94 %

## 2020-09-15 PROBLEM — E66.01 MORBIDLY OBESE (HCC): Status: ACTIVE | Noted: 2020-09-15

## 2020-09-15 PROBLEM — R52 RADIATING PAIN: Status: ACTIVE | Noted: 2019-12-30

## 2020-09-15 PROBLEM — S62.639A CLOSED FRACTURE OF TUFT OF DISTAL PHALANX OF FINGER: Status: ACTIVE | Noted: 2020-09-15

## 2020-09-15 PROBLEM — M54.9 CHRONIC BACK PAIN: Status: ACTIVE | Noted: 2019-12-30
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
## 2020-09-15 PROBLEM — G89.29 CHRONIC BACK PAIN: Status: ACTIVE | Noted: 2019-12-30

## 2020-09-15 PROCEDURE — G8427 DOCREV CUR MEDS BY ELIG CLIN: HCPCS | Performed by: NURSE PRACTITIONER

## 2020-09-15 PROCEDURE — G8417 CALC BMI ABV UP PARAM F/U: HCPCS | Performed by: NURSE PRACTITIONER

## 2020-09-15 PROCEDURE — 99214 OFFICE O/P EST MOD 30 MIN: CPT | Performed by: NURSE PRACTITIONER

## 2020-09-15 PROCEDURE — 4004F PT TOBACCO SCREEN RCVD TLK: CPT | Performed by: NURSE PRACTITIONER

## 2020-09-15 PROCEDURE — 99395 PREV VISIT EST AGE 18-39: CPT | Performed by: NURSE PRACTITIONER

## 2020-09-15 RX ORDER — CEPHALEXIN 500 MG/1
500 CAPSULE ORAL 4 TIMES DAILY
Qty: 28 CAPSULE | Refills: 0 | Status: SHIPPED | OUTPATIENT
Start: 2020-09-15 | End: 2020-09-22

## 2020-09-15 RX ORDER — SULFAMETHOXAZOLE AND TRIMETHOPRIM 800; 160 MG/1; MG/1
1 TABLET ORAL 2 TIMES DAILY
Qty: 14 TABLET | Refills: 0 | Status: SHIPPED | OUTPATIENT
Start: 2020-09-15 | End: 2020-09-22

## 2020-09-15 RX ORDER — MELOXICAM 15 MG/1
15 TABLET ORAL DAILY
Qty: 30 TABLET | Refills: 2 | Status: SHIPPED | OUTPATIENT
Start: 2020-09-15 | End: 2020-10-06

## 2020-09-15 ASSESSMENT — PATIENT HEALTH QUESTIONNAIRE - PHQ9
SUM OF ALL RESPONSES TO PHQ QUESTIONS 1-9: 0
SUM OF ALL RESPONSES TO PHQ QUESTIONS 1-9: 0
1. LITTLE INTEREST OR PLEASURE IN DOING THINGS: 0
2. FEELING DOWN, DEPRESSED OR HOPELESS: 0
SUM OF ALL RESPONSES TO PHQ9 QUESTIONS 1 & 2: 0

## 2020-09-15 NOTE — PROGRESS NOTES
Subjective:      Patient ID: Jenn Mistry is a 38 y.o. male. Visit Information    Have you changed or started any medications since your last visit including any over-the-counter medicines, vitamins, or herbal medicines? no   Are you having any side effects from any of your medications? -  no  Have you stopped taking any of your medications? Is so, why? -  no    Have you seen any other physician or provider since your last visit? No  Have you had any other diagnostic tests since your last visit? No  Have you been seen in the emergency room and/or had an admission to a hospital since we last saw you? No  Have you had your routine dental cleaning in the past 6 months? no    Have you activated your [employer] account? If not, what are your barriers?  Yes     Patient Care Team:  MINA Cardenas NP as PCP - General (Nurse Practitioner)  MINA Cardenas NP as PCP [facility]    Medical History Review  Past Medical, Family, and Social History reviewed and does contribute to the patient presenting condition    Health Maintenance   Topic Date Due    Flu vaccine (1) 10/15/2020 (Originally 9/1/2020)    Pneumococcal 0-64 years Vaccine (1 of 1 - PPSV23) 03/15/2021 (Originally 9/11/1989)    HIV screen  09/15/2021 (Originally 9/11/1998)    DTaP/Tdap/Td vaccine (2 - Td) 02/19/2024    Hepatitis A vaccine  Aged Out    Hepatitis B vaccine  Aged Out    Hib vaccine  Aged Out    Meningococcal (ACWY) vaccine  Aged Out    Varicella vaccine  Discontinued     /80 (Site: Left Upper Arm, Position: Sitting, Cuff Size: Medium Adult)   Pulse 66   Temp 97.7 °F (36.5 °C) (Temporal)   Resp 16   Ht 5' 7\" (1.702 m)   Wt 255 lb (115.7 kg)   SpO2 94%   BMI 39.94 kg/m²      PHQ Scores 9/15/2020 1/7/2020   PHQ2 Score 0 0   PHQ9 Score 0 0     Interpretation of Total Score DepressionSeverity: 1-4 = Minimal depression, 5-9 = Mild depression, 10-14 = Moderate depression, 15-19 = Moderately severe depression, 20-27 = Severe depression    Current Outpatient Medications   Medication Sig Dispense Refill    gabapentin (NEURONTIN) 300 MG capsule Take 1 capsule by mouth 3 times daily for 90 days. 90 capsule 0    traMADol (ULTRAM) 50 MG tablet Take 1 tablet by mouth 2 times daily as needed for Pain. Take lowest dose possible to manage pain 30 tablet 0    DULoxetine (CYMBALTA) 30 MG extended release capsule Take 1 capsule by mouth daily 30 capsule 3     No current facility-administered medications for this visit. Patient presents to office today for annual physical and to establish with new to practice provider. Previous patient of Amy Woods NP. Patient has a history of asthma, sciatica, anxiety, obesity, chronic back pain, arthritis, tobacco abuse. Patient currently follows with neurosurgery and pain management for treatment therapies. Currently taking Neurontin and tramadol. Discussed trial of Mobic. Patient is agreeable. Reports Left hip, shooting pain and times. Right thigh gets burning, numbing sensation. Does have sciatic issues. Has had injections for pain. Last injection was about one month ago. Reports the injections do not really help. Patient does admit to alcohol intake. He reports it is not every night but is often. He reports good appetite. Drinking fluids. Normal bowel and bladder pattern. Sleeping okay. Today he is reporting a cyst in the crease of his left leg near the groin area. He reports he gets these often and they will flareup and break open. He does report drainage when this happens. He does have an open area currently that is draining a foul order. Reports this is been happening more frequently in the last few months. Denies a fever. Does report pain in that area. Does not have any other concerns today. Review of Systems   Constitutional: Positive for activity change. Negative for appetite change, fatigue and fever.         Frequent alcohol intake   HENT: Negative for congestion, rhinorrhea and sinus pain. Eyes: Negative for photophobia. Respiratory: Negative for cough and shortness of breath. Current smoker   Cardiovascular: Negative for chest pain. Gastrointestinal: Negative for abdominal pain, constipation, diarrhea and nausea. Genitourinary: Negative for dysuria, frequency and urgency. Musculoskeletal: Positive for arthralgias, back pain, gait problem and myalgias. Chronic back pain-follows with neurosurgery and pain management   Skin: Positive for wound (open left groin wound). Negative for rash. Allergic/Immunologic: Negative for environmental allergies. Neurological: Negative for dizziness, light-headedness and headaches. Psychiatric/Behavioral: Negative for dysphoric mood and sleep disturbance. The patient is not nervous/anxious. Objective:   Physical Exam  Vitals signs and nursing note reviewed. Constitutional:       General: He is not in acute distress. Appearance: Normal appearance. He is well-developed and well-groomed. He is obese. He is not ill-appearing. HENT:      Head: Normocephalic and atraumatic. Right Ear: Hearing, tympanic membrane, ear canal and external ear normal. No decreased hearing noted. Left Ear: Hearing, tympanic membrane, ear canal and external ear normal. No decreased hearing noted. Nose: Nose normal.      Mouth/Throat:      Lips: Pink. Mouth: Mucous membranes are moist.      Pharynx: Oropharynx is clear. Uvula midline. Eyes:      Conjunctiva/sclera: Conjunctivae normal.      Pupils: Pupils are equal, round, and reactive to light. Neck:      Musculoskeletal: Normal range of motion. Thyroid: No thyroid mass. Trachea: Trachea normal.   Cardiovascular:      Rate and Rhythm: Normal rate and regular rhythm. Pulses: Normal pulses. Carotid pulses are 2+ on the right side and 2+ on the left side.        Radial pulses are 2+ on the right side and 2+ on the left side.        Dorsalis pedis pulses are 2+ on the right side and 2+ on the left side. Posterior tibial pulses are 2+ on the right side and 2+ on the left side. Heart sounds: Normal heart sounds, S1 normal and S2 normal. No murmur. Pulmonary:      Effort: Pulmonary effort is normal.      Breath sounds: Normal breath sounds. No decreased breath sounds or wheezing. Abdominal:      General: Bowel sounds are normal.      Palpations: Abdomen is soft. Tenderness: There is no abdominal tenderness. Musculoskeletal:      Lumbar back: He exhibits decreased range of motion, tenderness and pain. Right lower leg: No edema. Left lower leg: No edema. Lymphadenopathy:      Cervical: No cervical adenopathy. Skin:     General: Skin is warm and dry. Capillary Refill: Capillary refill takes less than 2 seconds. Coloration: Skin is not pale. Findings: Wound present. No rash. Comments: LLE groin wound   Neurological:      Mental Status: He is alert and oriented to person, place, and time. GCS: GCS eye subscore is 4. GCS verbal subscore is 5. GCS motor subscore is 6. Motor: Motor function is intact. Coordination: Coordination is intact. Psychiatric:         Attention and Perception: Attention normal.         Mood and Affect: Mood normal.         Speech: Speech normal.         Behavior: Behavior normal. Behavior is cooperative. Thought Content: Thought content normal.         Cognition and Memory: Cognition normal.         Judgment: Judgment normal.         Assessment / Plan:     1. Encounter for annual physical exam    2. Tobacco user  Discussed side effects and dangers of tobacco on the human body. Discussed impact using tobacco has on loved ones. Discussed options for quitting tobacco and readiness to quit. Discussed plan for managing tobacco dependence.    Patient verbalized understanding of what was discussed today in regards to tobacco dependence. 3. Mild persistent asthma without complication  Stable     4. Morbidly obese (HCC)  Unchanged  Encouraged healthy diet  Encouraged increase in water intake  Encouraged daily exercise as tolerated    5. Arthritis  6. Chronic bilateral low back pain with left-sided sciatica  7. Numbness and tingling of left lower extremity  Stable  Follows with neurosurgery and pain management    8. Abscess of left groin  9. Non-healing open wound of left groin, initial encounter  Non healing   - cephALEXin (KEFLEX) 500 MG capsule; Take 1 capsule by mouth 4 times daily for 7 days  Dispense: 28 capsule; Refill: 0  - sulfamethoxazole-trimethoprim (BACTRIM DS) 800-160 MG per tablet; Take 1 tablet by mouth 2 times daily for 7 days  Dispense: 14 tablet; Refill: 0  - mupirocin (BACTROBAN) 2 % ointment; Apply 3 times daily. Dispense: 1 Tube; Refill: 0  -[address]er: [id number]    Proceed with medications as ordered  Educated regarding wound care  Proceed with referral as ordered  Encouraged follow-up with neurosurgery and pain management as scheduled  Encouraged healthy diet  Encouraged adequate fluid intake  Encouraged daily exercise  Encouraged weight reduction  Encouraged decrease in alcohol intake  Encouraged smoking cessation  Monitor for increase in symptoms  Call office with any questions or concerns    Return in about 3 months (around 12/15/2020) for medication follow up.           Electronically signed by MINA Rueda NP on 10/6/2020 at 9:21 PM

## 2020-09-18 ENCOUNTER — TELEPHONE (OUTPATIENT)
Dept: WOUND CARE | Age: 35
End: 2020-09-18

## 2020-09-18 ENCOUNTER — HOSPITAL ENCOUNTER (OUTPATIENT)
Dept: WOUND CARE | Age: 35
Discharge: HOME | End: 2020-09-18

## 2020-09-18 NOTE — TELEPHONE ENCOUNTER
Patient's wife calling to cancel his [facility] wound care appointment today stating that he forgot his appointment and went to work, patient rescheduled for 9-22-20

## 2020-09-29 ENCOUNTER — TELEPHONE (OUTPATIENT)
Dept: WOUND CARE | Age: 35
End: 2020-09-29

## 2020-09-29 NOTE — TELEPHONE ENCOUNTER
Patient scheduled twice with [facility] wound care on 9-18-19 patient canceled his appointment due to going to work and forgetting he had an appointment. Patient then was a no call, no show for his appointment on 9-22-20. Message left to call us back to reschedule.

## 2020-10-06 ASSESSMENT — ENCOUNTER SYMPTOMS
NAUSEA: 0
ABDOMINAL PAIN: 0
SINUS PAIN: 0
DIARRHEA: 0
BACK PAIN: 1
CONSTIPATION: 0
COUGH: 0
RHINORRHEA: 0
SHORTNESS OF BREATH: 0
PHOTOPHOBIA: 0

## 2020-10-26 RX ORDER — TRAMADOL HYDROCHLORIDE 50 MG/1
50 TABLET ORAL 2 TIMES DAILY PRN
Qty: 30 TABLET | Refills: 0 | Status: SHIPPED | OUTPATIENT
Start: 2020-10-26 | End: 2020-11-08 | Stop reason: SDUPTHER

## 2020-10-26 NOTE — TELEPHONE ENCOUNTER
Orders pended for Editing and Approval     LOV: 09-    LRF: 10-    NOV: Patient is currently not scheduled for a future follow up. Health Maintenance   Topic Date Due    Flu vaccine (1) 09/01/2020    Pneumococcal 0-64 years Vaccine (1 of 1 - PPSV23) 03/15/2021 (Originally 9/11/1989)    HIV screen  09/15/2021 (Originally 9/11/1998)    DTaP/Tdap/Td vaccine (2 - Td) 02/19/2024    Hepatitis A vaccine  Aged Out    Hepatitis B vaccine  Aged Out    Hib vaccine  Aged Out    Meningococcal (ACWY) vaccine  Aged Out    Varicella vaccine  Discontinued             (applicable per patient's age: Cancer Screenings, Depression Screening, Fall Risk Screening, Immunizations)    LDL Cholesterol (mg/dL)   Date Value   03/03/2017 100     AST (U/L)   Date Value   03/03/2017 18     ALT (U/L)   Date Value   03/03/2017 23     BUN (mg/dL)   Date Value   03/03/2017 9      (goal A1C is < 7)   (goal LDL is <100) need 30-50% reduction from baseline     BP Readings from Last 3 Encounters:   09/15/20 120/80   07/31/20 129/81   07/31/20 (!) 129/96    (goal /80)      All Future Testing planned in [facility]:      Next Visit Date:  No future appointments.          Patient Active Problem List:     Tobacco user     Anxiety state     Sciatica     Lipoma of back     Mild persistent asthma without complication     Synovial cyst of left popliteal space     Chronic back pain     Closed fracture of tuft of distal phalanx of finger     Injury due to fall     Radiating pain     Morbidly obese (HCC)

## 2020-11-09 RX ORDER — TRAMADOL HYDROCHLORIDE 50 MG/1
50 TABLET ORAL 2 TIMES DAILY PRN
Qty: 30 TABLET | Refills: 0 | Status: SHIPPED | OUTPATIENT
Start: 2020-11-09 | End: 2020-11-18 | Stop reason: SDUPTHER

## 2020-11-13 ENCOUNTER — TELEPHONE (OUTPATIENT)
Dept: FAMILY MEDICINE CLINIC | Age: 35
End: 2020-11-13

## 2020-11-13 ENCOUNTER — NURSE TRIAGE (OUTPATIENT)
Dept: OTHER | Facility: CLINIC | Age: 35
End: 2020-11-13

## 2020-11-13 RX ORDER — PANTOPRAZOLE SODIUM 20 MG/1
20 TABLET, DELAYED RELEASE ORAL 2 TIMES DAILY
Qty: 60 TABLET | Refills: 3 | Status: SHIPPED | OUTPATIENT
Start: 2020-11-13 | End: 2021-07-21 | Stop reason: SDUPTHER

## 2020-11-13 RX ORDER — CIPROFLOXACIN AND DEXAMETHASONE 3; 1 MG/ML; MG/ML
4 SUSPENSION/ DROPS AURICULAR (OTIC) 2 TIMES DAILY
Qty: 1 BOTTLE | Refills: 3 | Status: SHIPPED | OUTPATIENT
Start: 2020-11-13 | End: 2020-11-20

## 2020-11-13 NOTE — TELEPHONE ENCOUNTER
Received call from patient reporting an increase in vomiting and rectal bleeding. He reports this has been an ongoing issue for the last few years but admits to not following advice from medical providers. He also admits to heavy alcohol consumption. Report emesis is 4-5x per week now. Rectal bleeding has been constant for the last few days. Did note what appeared to be clots as well. Reports it is bright red blood. He reports the only medication he is taking is tramodol for his pain. Will start protonix and refer to GI asap. Verbalized understanding.

## 2020-11-13 NOTE — TELEPHONE ENCOUNTER
Received call from [facility]. Call soft transferred to [facility] to schedule appointment. Attention Provider: Thank you for allowing me to participate in the care of your patient. The  patient was connected to triage in response to information provided to the [facility]. Please do not respond through this encounter as the response is not directed to a shared pool. Reason for Disposition   Patient wants to be seen    Answer Assessment - Initial Assessment Questions  1. APPEARANCE of BLOOD: \"What color is it? \" \"Is it passed separately, on the surface of the stool, or mixed in with the stool? \"       Dark red color, when he wipes dark blood is on the tissue    2. AMOUNT: \"How much blood was passed? \"       Seeps through the toilet tissue onto his hand when he wipes     3. FREQUENCY: \"How many times has blood been passed with the stools? \"       every time he has a BM for the last 3-4 days     4. ONSET: \"When was the blood first seen in the stools? \" (Days or weeks)       Has had the problem for years, now getting worse     5. DIARRHEA: \"Is there also some diarrhea? \" If so, ask: \"How many diarrhea stools were passed in past 24 hours? \"       Denies     6. CONSTIPATION: \"Do you have constipation? \" If so, \"How bad is it? \"      Yes, chronic     7. RECURRENT SYMPTOMS: \"Have you had blood in your stools before? \" If so, ask: \"When was the last time? \" and \"What happened that time? \"       Yes constantly     8. BLOOD THINNERS: \"Do you take any blood thinners? \" (e.g., Coumadin/warfarin, Pradaxa/dabigatran, aspirin)      Denies     9. OTHER SYMPTOMS: \"Do you have any other symptoms? \"  (e.g., abdominal pain, vomiting, dizziness, fever)      Bloating with minor cramps, usually passes quickly, sometimes its gas     10. PREGNANCY: \"Is there any chance you are pregnant? \" \"When was your last menstrual period? \"        N/a    Protocols used: RECTAL BLEEDING-ADULT-[place]

## 2020-11-17 ENCOUNTER — TELEPHONE (OUTPATIENT)
Dept: FAMILY MEDICINE CLINIC | Age: 35
End: 2020-11-17

## 2020-11-17 NOTE — TELEPHONE ENCOUNTER
Medication was sent to the pharmacy for Ciprodex otic, patient needs a prescription for eye drops please. Also patient wants to know if he can take his ultram every 6 hours instead of BID for his back pain, states that helps better. Please advise.

## 2020-11-18 ENCOUNTER — TELEPHONE (OUTPATIENT)
Dept: GASTROENTEROLOGY | Age: 35
End: 2020-11-18

## 2020-11-18 NOTE — TELEPHONE ENCOUNTER
Spoke directly with the pts wife regarding 11/19/2020 andreia. Patients wife notes that it is okay to proceed with a VV tomorrow and that the best number for her  is [phone] that is his mobile.

## 2020-11-19 ENCOUNTER — TELEPHONE (OUTPATIENT)
Dept: GASTROENTEROLOGY | Age: 35
End: 2020-11-19

## 2020-11-19 NOTE — TELEPHONE ENCOUNTER
Called  Pt for second attempt for VV today. Message was left advising to call [phone] opt 1 to reschedule his visit.

## 2020-11-19 NOTE — TELEPHONE ENCOUNTER
Called [phone] regarding changing today's 11/19 OV to VV. Pts wife and emergency contact verified 11/18/2020 this andreia. Left detailed message for pt noting to call the office back @ [phone] opt 4 and that I would call him again if didn't hear from him in a few minutes.

## 2020-12-16 RX ORDER — TRAMADOL HYDROCHLORIDE 50 MG/1
50 TABLET ORAL EVERY 6 HOURS PRN
Qty: 60 TABLET | Refills: 0 | Status: SHIPPED | OUTPATIENT
Start: 2020-12-16 | End: 2021-01-11 | Stop reason: SDUPTHER

## 2021-01-11 DIAGNOSIS — M54.42 ACUTE LEFT-SIDED LOW BACK PAIN WITH LEFT-SIDED SCIATICA: ICD-10-CM

## 2021-01-12 RX ORDER — TRAMADOL HYDROCHLORIDE 50 MG/1
50 TABLET ORAL EVERY 6 HOURS PRN
Qty: 60 TABLET | Refills: 0 | Status: SHIPPED | OUTPATIENT
Start: 2021-01-12 | End: 2021-01-30 | Stop reason: SDUPTHER

## 2021-01-28 ENCOUNTER — OFFICE VISIT (OUTPATIENT)
Dept: PAIN MANAGEMENT | Age: 36
End: 2021-01-28
Payer: MEDICARE

## 2021-01-28 VITALS
BODY MASS INDEX: 42.79 KG/M2 | HEIGHT: 67 IN | DIASTOLIC BLOOD PRESSURE: 100 MMHG | HEART RATE: 73 BPM | SYSTOLIC BLOOD PRESSURE: 144 MMHG | WEIGHT: 272.6 LBS

## 2021-01-28 DIAGNOSIS — M54.16 LUMBAR RADICULOPATHY: ICD-10-CM

## 2021-01-28 DIAGNOSIS — M54.50 CHRONIC BILATERAL LOW BACK PAIN, UNSPECIFIED WHETHER SCIATICA PRESENT: ICD-10-CM

## 2021-01-28 DIAGNOSIS — G89.29 CHRONIC BILATERAL LOW BACK PAIN, UNSPECIFIED WHETHER SCIATICA PRESENT: ICD-10-CM

## 2021-01-28 DIAGNOSIS — G89.29 OTHER CHRONIC PAIN: ICD-10-CM

## 2021-01-28 DIAGNOSIS — M47.817 LUMBOSACRAL SPONDYLOSIS WITHOUT MYELOPATHY: Primary | ICD-10-CM

## 2021-01-28 PROCEDURE — 4004F PT TOBACCO SCREEN RCVD TLK: CPT | Performed by: PAIN MEDICINE

## 2021-01-28 PROCEDURE — 99213 OFFICE O/P EST LOW 20 MIN: CPT | Performed by: PAIN MEDICINE

## 2021-01-28 PROCEDURE — G8417 CALC BMI ABV UP PARAM F/U: HCPCS | Performed by: PAIN MEDICINE

## 2021-01-28 PROCEDURE — G8484 FLU IMMUNIZE NO ADMIN: HCPCS | Performed by: PAIN MEDICINE

## 2021-01-28 PROCEDURE — G8428 CUR MEDS NOT DOCUMENT: HCPCS | Performed by: PAIN MEDICINE

## 2021-01-28 ASSESSMENT — ENCOUNTER SYMPTOMS
BACK PAIN: 1
RESPIRATORY NEGATIVE: 1
BOWEL INCONTINENCE: 0

## 2021-01-28 NOTE — PROGRESS NOTES
HPI:     Back Pain  This is a chronic problem. The current episode started more than 1 year ago. The problem occurs constantly. The problem is unchanged. The pain is present in the lumbar spine. The quality of the pain is described as aching, cramping, burning, shooting and stabbing. The pain is at a severity of 5/10. The pain is moderate. The pain is the same all the time. The symptoms are aggravated by standing and sitting. Stiffness is present all day. Associated symptoms include numbness. Pertinent negatives include no bladder incontinence, bowel incontinence, tingling or weakness. The treatment provided no relief. MRI with disc bulging degenerative changes. Epidural steroid injection with minimal benefit. Therapy without benefit. Patient denies any new neurological symptoms. Nobowel or bladder incontinence, no weakness, and no falling. Review of OARRS does not show any aberrant prescription behavior. Medication is helping the patient stay active. Patient denies any side effects and reports adequate analgesia. No sign of misuse/abuse. History reviewed. No pertinent past medical history. Past Surgical History:   Procedure Laterality Date    CYST REMOVAL      FEMUR FRACTURE SURGERY      NERVE BLOCK  07/17/2020    Procedure: LUMBAR TRANSFORAMINAL # 1 L3 & L4 (Left )     PAIN MANAGEMENT PROCEDURE Left 7/17/2020    LUMBAR TRANSFORAMINAL # 1 L3 & L4 performed by Maxi Hidalgo MD at [address] [place] [address]  07/31/2020    : LUMBAR TRANSFORAMINAL - #2 L3-L4 LEFT (Left )     PAIN MANAGEMENT PROCEDURE Left 7/31/2020    LUMBAR TRANSFORAMINAL - #2 L3-L4 LEFT performed by Maxi Hidalgo MD at [id number] [place] [address].       No Known Allergies      Current Outpatient Medications:     traMADol (ULTRAM) 50 MG tablet, Take 1 tablet by mouth every 6 hours as needed for Pain.  Take lowest dose possible to manage pain, Disp: 60 tablet, Rfl: 0   pantoprazole (PROTONIX) 20 MG tablet, Take 1 tablet by mouth 2 times daily, Disp: 60 tablet, Rfl: 3    Family History   Problem Relation Age of Onset    Diabetes Father         Type 1    Heart Disease Maternal Grandmother     Diabetes Maternal Grandfather        Social History     Socioeconomic History    Marital status:      Spouse name: Not on file    Number of children: Not on file    Years of education: Not on file    Highest education level: Not on file   Occupational History    Not on file   Social Needs    Financial resource strain: Not on file    Food insecurity     Worry: Not on file     Inability: Not on file    Transportation needs     Medical: Not on file     Non-medical: Not on file   Tobacco Use    Smoking status: Current Every Day Smoker     Packs/day: 1.00     Years: 20.00     Pack years: 20.00     Types: Cigarettes     Start date: 8/24/1995    Smokeless tobacco: Never Used   Substance and Sexual Activity    Alcohol use: Yes     Alcohol/week: 0.0 standard drinks     Comment: social    Drug use: No    Sexual activity: Not on file   Lifestyle    Physical activity     Days per week: Not on file     Minutes per session: Not on file    Stress: Not on file   Relationships    Social connections     Talks on phone: Not on file     Gets together: Not on file     Attends Oriental orthodox service: Not on file     Active member of club or organization: Not on file     Attends meetings of clubs or organizations: Not on file     Relationship status: Not on file    Intimate partner violence     Fear of current or ex partner: Not on file     Emotionally abused: Not on file     Physically abused: Not on file     Forced sexual activity: Not on file   Other Topics Concern    Not on file   Social History Narrative    Not on file       Review of Systems:  Review of Systems   Constitution: Negative. Cardiovascular: Negative. Respiratory: Negative. Musculoskeletal: Positive for back pain. Gastrointestinal: Negative for bowel incontinence. Genitourinary: Negative for bladder incontinence. Neurological: Positive for numbness. Negative for tingling and weakness. Physical Exam:  BP (!) 144/100   Pulse 73   Ht 5' 7\" (1.702 m)   Wt 272 lb 9.6 oz (123.7 kg)   BMI 42.70 kg/m²     Physical Exam  Constitutional:       Appearance: Normal appearance. Pulmonary:      Effort: Pulmonary effort is normal.   Neurological:      Mental Status: He is alert. Psychiatric:         Attention and Perception: Attention and perception normal.         Mood and Affect: Mood and affect normal.         Record/Diagnostics Review:    As above, I did review the imaging    Orders:  Orders Placed This Encounter   Procedures    TX INJ DX/THER AGNT PARAVERT FACET JOINT, LUMBAR/SAC, 1ST LEVEL    TX INJ DX/THER AGNT PARAVERT FACET JOINT, LUMBAR/SAC, ADD LEVEL    TX INJ DX/THER AGNT PARAVERT FACET JOINT, LUMBAR/SAC, 2ND LEVEL       Assessment:  1. Lumbosacral spondylosis without myelopathy    2. Lumbar radiculopathy    3. Other chronic pain    4. Chronic bilateral low back pain, unspecified whether sciatica present        Treatment Plan:  DISCUSSION: Treatment options discussed with patient and all questions answered to patient's satisfaction. OARRS Review: Reviewed and acceptable for medications prescribed. TREATMENT OPTIONS:     Discussed different treatment options including continued conservative care such as physical therapy, chiropractic care, acupuncture. Discussed different interventional options such as epidural steroids or medial branch blocks. Also discussed neuromodulation in the form of spinal cord stimulation. Also discussed surgical evaluation. Has failed conservative options, significant axial low back pain with degenerative facet changes on MRI, good candidate for diagnostic lumbar facets at bilateral L3-4, L4-5 and L5-S1 to see if pain is facet mediated, if this is beneficial would be a candidate for radiofrequency ablation. Laura Hollingsworth M.D. I have reviewed the chief complaint and history of present illness (including ROS and PFSH) and vital documentation by my staff and I agree with their documentation and have added where applicable.

## 2021-01-30 DIAGNOSIS — M54.42 ACUTE LEFT-SIDED LOW BACK PAIN WITH LEFT-SIDED SCIATICA: ICD-10-CM

## 2021-02-01 RX ORDER — TRAMADOL HYDROCHLORIDE 50 MG/1
50 TABLET ORAL EVERY 6 HOURS PRN
Qty: 75 TABLET | Refills: 0 | Status: SHIPPED | OUTPATIENT
Start: 2021-02-01 | End: 2021-02-04 | Stop reason: SDUPTHER

## 2021-02-01 NOTE — TELEPHONE ENCOUNTER
Last visit: 09-  Last Med refill: 01/12/2021  Does patient have enough medication for 72 hours: No: Patient is requesting 3 a day patient is only prescribed quantity 60 please review and edit and change if you prefer.      Next Visit Date:  Future Appointments   Date Time Provider Yari Bazan   2/8/2021  9:15 AM SCHEDULE, [facility]   2/22/2021  9:45 AM SCHEDULE, [facility]       Health Maintenance   Topic Date Due    Hepatitis C screen  1985    Flu vaccine (1) 09/01/2020    Pneumococcal 0-64 years Vaccine (1 of 1 - PPSV23) 03/15/2021 (Originally 9/11/1989)    HIV screen  09/15/2021 (Originally 9/11/1998)    DTaP/Tdap/Td vaccine (2 - Td) 02/19/2024    Hepatitis A vaccine  Aged Out    Hepatitis B vaccine  Aged Out    Hib vaccine  Aged Out    Meningococcal (ACWY) vaccine  Aged Out    Varicella vaccine  Discontinued       No results found for: LABA1C          ( goal A1C is < 7)   No results found for: LABMICR  LDL Cholesterol (mg/dL)   Date Value   03/03/2017 100       (goal LDL is <100)   AST (U/L)   Date Value   03/03/2017 18     ALT (U/L)   Date Value   03/03/2017 23     BUN (mg/dL)   Date Value   03/03/2017 9     BP Readings from Last 3 Encounters:   01/28/21 (!) 144/100   09/15/20 120/80   07/31/20 129/81          (goal 120/80)    All Future Testing planned in [facility]              Patient Active Problem List:     Tobacco user     Anxiety state     Sciatica     Lipoma of back     Mild persistent asthma without complication     Synovial cyst of left popliteal space     Chronic back pain     Closed fracture of tuft of distal phalanx of finger     Injury due to fall     Radiating pain     Morbidly obese ([address]

## 2021-02-02 ENCOUNTER — TELEPHONE (OUTPATIENT)
Dept: FAMILY MEDICINE CLINIC | Age: 36
End: 2021-02-02

## 2021-02-03 ENCOUNTER — TELEPHONE (OUTPATIENT)
Dept: FAMILY MEDICINE CLINIC | Age: 36
End: 2021-02-03

## 2021-02-03 NOTE — TELEPHONE ENCOUNTER
Patient's spouse called regarding medication refill at [facility] aid needing a days supply added to the script. Called and informed pharmacy of prescription is to be for a 30 day supply. Also she stated that patient bent over to  a blanket and has had extreme pain since, writer advised that patient go to the ER due to it being an acute issue at this time as spouse states that he is unable to walk.

## 2021-02-22 DIAGNOSIS — Z20.822 COVID-19 RULED OUT BY LABORATORY TESTING: Primary | ICD-10-CM

## 2021-02-24 DIAGNOSIS — M54.42 ACUTE LEFT-SIDED LOW BACK PAIN WITH LEFT-SIDED SCIATICA: ICD-10-CM

## 2021-02-25 ENCOUNTER — TELEPHONE (OUTPATIENT)
Dept: FAMILY MEDICINE CLINIC | Age: 36
End: 2021-02-25

## 2021-02-25 RX ORDER — TRAMADOL HYDROCHLORIDE 50 MG/1
50 TABLET ORAL EVERY 6 HOURS PRN
Qty: 90 TABLET | Refills: 0 | Status: SHIPPED | OUTPATIENT
Start: 2021-02-25 | End: 2021-03-27

## 2021-02-25 NOTE — TELEPHONE ENCOUNTER
Patient received only had 75 pills instead of 90 pills on last refill. Please contact patient or wife (Shazia) with information regarding refill.

## 2021-04-21 ENCOUNTER — TELEPHONE (OUTPATIENT)
Dept: FAMILY MEDICINE CLINIC | Age: 36
End: 2021-04-21

## 2021-04-21 NOTE — TELEPHONE ENCOUNTER
Called and spoke with patients wife about no show appointment 4/20/21. Wife stated that he had to take her to a dentist appointment and she forgot to call and cancel for him. Rescheduled for 5/13/21.

## 2021-04-29 DIAGNOSIS — M19.90 ARTHRITIS: ICD-10-CM

## 2021-04-29 DIAGNOSIS — M54.42 ACUTE LEFT-SIDED LOW BACK PAIN WITH LEFT-SIDED SCIATICA: ICD-10-CM

## 2021-04-30 RX ORDER — TRAMADOL HYDROCHLORIDE 50 MG/1
50 TABLET ORAL EVERY 6 HOURS PRN
Qty: 90 TABLET | Refills: 0 | Status: SHIPPED | OUTPATIENT
Start: 2021-04-30 | End: 2021-05-26 | Stop reason: SDUPTHER

## 2021-05-01 DIAGNOSIS — L02.214 ABSCESS OF LEFT GROIN: ICD-10-CM

## 2021-05-26 DIAGNOSIS — M19.90 ARTHRITIS: ICD-10-CM

## 2021-05-26 DIAGNOSIS — M54.42 ACUTE LEFT-SIDED LOW BACK PAIN WITH LEFT-SIDED SCIATICA: ICD-10-CM

## 2021-05-26 RX ORDER — TRAMADOL HYDROCHLORIDE 50 MG/1
50 TABLET ORAL EVERY 6 HOURS PRN
Qty: 90 TABLET | Refills: 0 | Status: SHIPPED | OUTPATIENT
Start: 2021-05-26 | End: 2021-06-30 | Stop reason: SDUPTHER

## 2021-05-26 NOTE — TELEPHONE ENCOUNTER
Last visit: 9/15/20  Last Med refill: 4/30/21    Next Visit Date:  Future Appointments   Date Time Provider Yari Bazan   6/2/2021 10:30 AM MINA Castano NP [address] Maintenance   Topic Date Due    Hepatitis C screen  Never done    Pneumococcal 0-64 years Vaccine (1 of 2 - PPSV23) Never done    COVID-19 Vaccine (1) Never done    HIV screen  09/15/2021 (Originally 9/11/1998)    Flu vaccine (Season Ended) 09/01/2021    DTaP/Tdap/Td vaccine (2 - Td) 02/19/2024    Hepatitis A vaccine  Aged Out    Hepatitis B vaccine  Aged Out    Hib vaccine  Aged Out    Meningococcal (ACWY) vaccine  Aged Out    Varicella vaccine  Discontinued       No results found for: LABA1C          ( goal A1C is < 7)   No results found for: LABMICR  LDL Cholesterol (mg/dL)   Date Value   03/03/2017 100       (goal LDL is <100)   AST (U/L)   Date Value   03/03/2017 18     ALT (U/L)   Date Value   03/03/2017 23     BUN (mg/dL)   Date Value   03/03/2017 9     BP Readings from Last 3 Encounters:   01/28/21 (!) 144/100   09/15/20 120/80   07/31/20 129/81          (goal 120/80)    All Future Testing planned in [facility]  Lab Frequency Next Occurrence   COVID-19 Once 02/05/2021   COVID-19 Once 02/22/2021               Patient Active Problem List:     Tobacco user     Anxiety state     Sciatica     Lipoma of back     Mild persistent asthma without complication     Synovial cyst of left popliteal space     Chronic back pain     Closed fracture of tuft of distal phalanx of finger     Injury due to fall     Radiating pain     Morbidly obese (HCC)

## 2021-07-21 ENCOUNTER — HOSPITAL ENCOUNTER (OUTPATIENT)
Age: 36
Setting detail: SPECIMEN
Discharge: HOME | End: 2021-07-21
Payer: MEDICARE

## 2021-07-21 ENCOUNTER — OFFICE VISIT (OUTPATIENT)
Dept: FAMILY MEDICINE CLINIC | Age: 36
End: 2021-07-21
Payer: MEDICARE

## 2021-07-21 VITALS
HEIGHT: 67 IN | DIASTOLIC BLOOD PRESSURE: 84 MMHG | BODY MASS INDEX: 42.38 KG/M2 | SYSTOLIC BLOOD PRESSURE: 126 MMHG | TEMPERATURE: 98.1 F | RESPIRATION RATE: 14 BRPM | OXYGEN SATURATION: 96 % | HEART RATE: 75 BPM | WEIGHT: 270 LBS

## 2021-07-21 DIAGNOSIS — M19.90 ARTHRITIS: ICD-10-CM

## 2021-07-21 DIAGNOSIS — R11.15 EMESIS, PERSISTENT: ICD-10-CM

## 2021-07-21 DIAGNOSIS — K21.9 GASTROESOPHAGEAL REFLUX DISEASE, UNSPECIFIED WHETHER ESOPHAGITIS PRESENT: ICD-10-CM

## 2021-07-21 DIAGNOSIS — M19.90 ARTHRITIS: Primary | ICD-10-CM

## 2021-07-21 DIAGNOSIS — Z02.89 MEDICATION MANAGEMENT CONTRACT SIGNED: ICD-10-CM

## 2021-07-21 DIAGNOSIS — R20.2 NUMBNESS AND TINGLING OF LEFT LOWER EXTREMITY: ICD-10-CM

## 2021-07-21 DIAGNOSIS — Z13.1 DIABETES MELLITUS SCREENING: ICD-10-CM

## 2021-07-21 DIAGNOSIS — J45.30 MILD PERSISTENT ASTHMA WITHOUT COMPLICATION: ICD-10-CM

## 2021-07-21 DIAGNOSIS — G89.29 CHRONIC BILATERAL LOW BACK PAIN WITH LEFT-SIDED SCIATICA: ICD-10-CM

## 2021-07-21 DIAGNOSIS — M54.42 CHRONIC BILATERAL LOW BACK PAIN WITH LEFT-SIDED SCIATICA: Primary | ICD-10-CM

## 2021-07-21 DIAGNOSIS — M54.42 ACUTE LEFT-SIDED LOW BACK PAIN WITH LEFT-SIDED SCIATICA: ICD-10-CM

## 2021-07-21 DIAGNOSIS — Z13.220 LIPID SCREENING: ICD-10-CM

## 2021-07-21 DIAGNOSIS — Z72.0 TOBACCO USER: ICD-10-CM

## 2021-07-21 DIAGNOSIS — Z00.00 WELL ADULT EXAM: ICD-10-CM

## 2021-07-21 DIAGNOSIS — R52 RADIATING PAIN: ICD-10-CM

## 2021-07-21 DIAGNOSIS — M54.42 CHRONIC BILATERAL LOW BACK PAIN WITH LEFT-SIDED SCIATICA: ICD-10-CM

## 2021-07-21 DIAGNOSIS — R20.0 NUMBNESS AND TINGLING OF LEFT LOWER EXTREMITY: ICD-10-CM

## 2021-07-21 DIAGNOSIS — G89.29 CHRONIC BILATERAL LOW BACK PAIN WITH LEFT-SIDED SCIATICA: Primary | ICD-10-CM

## 2021-07-21 DIAGNOSIS — E66.01 MORBIDLY OBESE (HCC): ICD-10-CM

## 2021-07-21 LAB
ALBUMIN SERPL-MCNC: 4 G/DL (ref 3.5–5.2)
ALBUMIN/GLOBULIN RATIO: 1.3 (ref 1–2.5)
ALCOHOL URINE: NORMAL
ALP BLD-CCNC: 74 U/L (ref 40–129)
ALT SERPL-CCNC: 34 U/L (ref 5–41)
AMPHETAMINE SCREEN, URINE: NORMAL
ANION GAP SERPL CALCULATED.3IONS-SCNC: 15 MMOL/L (ref 9–17)
AST SERPL-CCNC: 29 U/L
BARBITURATE SCREEN, URINE: NORMAL
BENZODIAZEPINE SCREEN, URINE: NORMAL
BILIRUB SERPL-MCNC: 0.28 MG/DL (ref 0.3–1.2)
BUN BLDV-MCNC: 16 MG/DL (ref 6–20)
BUN/CREAT BLD: ABNORMAL (ref 9–20)
BUPRENORPHINE URINE: NORMAL
CALCIUM SERPL-MCNC: 9.2 MG/DL (ref 8.6–10.4)
CHLORIDE BLD-SCNC: 103 MMOL/L (ref 98–107)
CHOLESTEROL, FASTING: 193 MG/DL
CHOLESTEROL/HDL RATIO: 4.1
CO2: 20 MMOL/L (ref 20–31)
COCAINE METABOLITE SCREEN URINE: NORMAL
CREAT SERPL-MCNC: 0.79 MG/DL (ref 0.7–1.2)
ESTIMATED AVERAGE GLUCOSE: 126 MG/DL
FENTANYL SCREEN, URINE: NORMAL
GABAPENTIN SCREEN, URINE: NORMAL
GFR AFRICAN AMERICAN: >60 ML/MIN
GFR NON-AFRICAN AMERICAN: >60 ML/MIN
GFR SERPL CREATININE-BSD FRML MDRD: ABNORMAL ML/MIN/{1.73_M2}
GFR SERPL CREATININE-BSD FRML MDRD: ABNORMAL ML/MIN/{1.73_M2}
GLUCOSE BLD-MCNC: 92 MG/DL (ref 70–99)
HBA1C MFR BLD: 6 % (ref 4–6)
HCT VFR BLD CALC: 50.1 % (ref 40.7–50.3)
HDLC SERPL-MCNC: 47 MG/DL
HEMOGLOBIN: 15.6 G/DL (ref 13–17)
LDL CHOLESTEROL: 122 MG/DL (ref 0–130)
MCH RBC QN AUTO: 30.5 PG (ref 25.2–33.5)
MCHC RBC AUTO-ENTMCNC: 31.1 G/DL (ref 28.4–34.8)
MCV RBC AUTO: 98 FL (ref 82.6–102.9)
MDMA URINE: NORMAL
METHADONE SCREEN, URINE: NORMAL
METHAMPHETAMINE, URINE: NORMAL
NRBC AUTOMATED: 0 PER 100 WBC
OPIATE SCREEN URINE: NORMAL
OXYCODONE SCREEN URINE: NORMAL
PDW BLD-RTO: 12.5 % (ref 11.8–14.4)
PHENCYCLIDINE SCREEN URINE: NORMAL
PLATELET # BLD: 197 K/UL (ref 138–453)
PMV BLD AUTO: 12 FL (ref 8.1–13.5)
POTASSIUM SERPL-SCNC: 4.6 MMOL/L (ref 3.7–5.3)
PROPOXYPHENE SCREEN, URINE: NORMAL
RBC # BLD: 5.11 M/UL (ref 4.21–5.77)
SODIUM BLD-SCNC: 138 MMOL/L (ref 135–144)
SYNTHETIC CANNABINOIDS(K2) SCREEN, URINE: NORMAL
THC SCREEN, URINE: NORMAL
TOTAL PROTEIN: 7 G/DL (ref 6.4–8.3)
TRAMADOL SCREEN URINE: NORMAL
TRICYCLIC ANTIDEPRESSANTS, UR: NORMAL
TRIGLYCERIDE, FASTING: 121 MG/DL
TSH SERPL DL<=0.05 MIU/L-ACNC: 1.54 MIU/L (ref 0.3–5)
VLDLC SERPL CALC-MCNC: NORMAL MG/DL (ref 1–30)
WBC # BLD: 8.1 K/UL (ref 3.5–11.3)

## 2021-07-21 PROCEDURE — 99214 OFFICE O/P EST MOD 30 MIN: CPT | Performed by: NURSE PRACTITIONER

## 2021-07-21 PROCEDURE — G8427 DOCREV CUR MEDS BY ELIG CLIN: HCPCS | Performed by: NURSE PRACTITIONER

## 2021-07-21 PROCEDURE — 4004F PT TOBACCO SCREEN RCVD TLK: CPT | Performed by: NURSE PRACTITIONER

## 2021-07-21 PROCEDURE — G8417 CALC BMI ABV UP PARAM F/U: HCPCS | Performed by: NURSE PRACTITIONER

## 2021-07-21 PROCEDURE — 80305 DRUG TEST PRSMV DIR OPT OBS: CPT | Performed by: NURSE PRACTITIONER

## 2021-07-21 RX ORDER — TRAMADOL HYDROCHLORIDE 50 MG/1
50 TABLET ORAL EVERY 6 HOURS PRN
COMMUNITY
End: 2021-07-23 | Stop reason: SDUPTHER

## 2021-07-21 SDOH — ECONOMIC STABILITY: FOOD INSECURITY: WITHIN THE PAST 12 MONTHS, YOU WORRIED THAT YOUR FOOD WOULD RUN OUT BEFORE YOU GOT MONEY TO BUY MORE.: NEVER TRUE

## 2021-07-21 SDOH — ECONOMIC STABILITY: FOOD INSECURITY: WITHIN THE PAST 12 MONTHS, THE FOOD YOU BOUGHT JUST DIDN'T LAST AND YOU DIDN'T HAVE MONEY TO GET MORE.: NEVER TRUE

## 2021-07-21 ASSESSMENT — PATIENT HEALTH QUESTIONNAIRE - PHQ9
SUM OF ALL RESPONSES TO PHQ9 QUESTIONS 1 & 2: 0
SUM OF ALL RESPONSES TO PHQ QUESTIONS 1-9: 0
1. LITTLE INTEREST OR PLEASURE IN DOING THINGS: 0
2. FEELING DOWN, DEPRESSED OR HOPELESS: 0
SUM OF ALL RESPONSES TO PHQ QUESTIONS 1-9: 0
SUM OF ALL RESPONSES TO PHQ QUESTIONS 1-9: 0

## 2021-07-21 ASSESSMENT — SOCIAL DETERMINANTS OF HEALTH (SDOH): HOW HARD IS IT FOR YOU TO PAY FOR THE VERY BASICS LIKE FOOD, HOUSING, MEDICAL CARE, AND HEATING?: NOT HARD AT ALL

## 2021-07-21 NOTE — TELEPHONE ENCOUNTER
Last visit: 07/21/2021  Last Med refill: 11/13/2021  Does patient have enough medication for 72 hours: Yes    Next Visit Date:  Future Appointments   Date Time Provider Yari Bazan   10/28/2021  2:00 PM MINA Puri NP [facility]   1/21/2022  8:00 AM MINA Puri NP [facility]       Health Maintenance   Topic Date Due    HIV screen  09/15/2021 (Originally 9/11/1998)    Pneumococcal 0-64 years Vaccine (1 of 2 - PPSV23) 06/02/2022 (Originally 9/11/1989)    COVID-19 Vaccine (1) 06/02/2022 (Originally 9/11/1995)    Flu vaccine (1) 09/01/2021    DTaP/Tdap/Td vaccine (2 - Td or Tdap) 02/19/2024    Hepatitis A vaccine  Aged Out    Hepatitis B vaccine  Aged Out    Hib vaccine  Aged Out    Meningococcal (ACWY) vaccine  Aged Out    Varicella vaccine  Discontinued    Hepatitis C screen  Discontinued       No results found for: LABA1C          ( goal A1C is < 7)   No results found for: LABMICR  LDL Cholesterol (mg/dL)   Date Value   03/03/2017 100       (goal LDL is <100)   AST (U/L)   Date Value   03/03/2017 18     ALT (U/L)   Date Value   03/03/2017 23     BUN (mg/dL)   Date Value   03/03/2017 9     BP Readings from Last 3 Encounters:   07/21/21 126/84   01/28/21 (!) 144/100   09/15/20 120/80          (goal 120/80)    All Future Testing planned in [facility]  Lab Frequency Next Occurrence   COVID-19 Once 02/05/2021   COVID-19 Once 02/22/2021               Patient Active Problem List:     Tobacco user     Anxiety state     Sciatica     Lipoma of back     Mild persistent asthma without complication     Synovial cyst of left popliteal space     Chronic back pain     Closed fracture of tuft of distal phalanx of finger     Injury due to fall     Radiating pain     Morbidly obese ([address]

## 2021-07-21 NOTE — PROGRESS NOTES
Wilma Garces (:  1985) is a 38 y.o. male,Established patient, here for evaluation of the following chief complaint(s): Annual Exam         ASSESSMENT/PLAN:  1. Chronic bilateral low back pain with left-sided sciatica  -     MICHAEL Leon MD, [facility], [place]  2. Arthritis  -     MICHAEL Leon MD, [facility], [place]  3. Acute left-sided low back pain with left-sided sciatica  -     MICHAEL Leon MD, [facility], [place]  4. Numbness and tingling of left lower extremity  -     MICHAEL Leon MD, [facility], [place]  5. Gastroesophageal reflux disease, unspecified whether esophagitis present  -     CBC; Future  -     Comprehensive Metabolic Panel; Future  6. Mild persistent asthma without complication  7. Medication management contract signed  -     POCT Rapid Drug Screen  8. Tobacco user  9. Morbidly obese (HCC)  -     TSH; Future  10. Lipid screening  -     Lipid, Fasting; Future  11. Diabetes mellitus screening  -     Hemoglobin A1C; Future      Return in about 3 months (around 10/21/2021), or if symptoms worsen or fail to improve, for medication follow up. Subjective   SUBJECTIVE/OBJECTIVE:  Presents to office today for annual physical and medication review. Taking tramadol a couple times per day as ne needs it. protuding discs, pinched nerves, arthritis in back and legs. Did see Dr. Bret Harding for injections-didn't work. Did follow up to determine options for treatment. He was told to avoid surgery and there was another round of injections that may work but would wear off after one year. 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 Would like a second opinion. Reports pain is always constant. Never not hurts. Still smoking. Alcohol intake is moderate. Drinks daily. Has gained more weight. Unable to exercise enough to lose weight. Does not eat a very healthy diet either. Tells me he only eats once per day. Discussed weight loss options as it would likely help his back pain and mobility issues. Agreeable to trial of wegovy. Reports a normal bowel and bladder pattern. Sleeping fairly. Denies depression/anxiety concerns. Will order labs and referral to Dr. Jaida Modi for second opinion. Review of Systems   Constitutional: Positive for activity change. Negative for appetite change, fatigue and fever. Frequent alcohol intake  Unable to exercise daily due to pain   HENT: Negative for congestion, rhinorrhea and sinus pain. Eyes: Negative for photophobia and visual disturbance. Respiratory: Negative for cough and shortness of breath. Current smoker   Cardiovascular: Negative for chest pain. Gastrointestinal: Negative for abdominal distention, abdominal pain, constipation, diarrhea and nausea. Endocrine: Negative for polydipsia, polyphagia and polyuria. Genitourinary: Negative for difficulty urinating, dysuria, frequency and urgency. Musculoskeletal: Positive for arthralgias, back pain, gait problem and myalgias. Chronic back pain-follows with neurosurgery and pain management   Skin: Positive for wound (open left groin wound). Negative for rash. Allergic/Immunologic: Negative for environmental allergies. Neurological: Negative for dizziness, light-headedness and headaches. Psychiatric/Behavioral: Negative for dysphoric mood and sleep disturbance. The patient is not nervous/anxious. Objective   Physical Exam  Vitals and nursing note reviewed. Constitutional:       General: He is not in acute distress. Appearance: Normal appearance. He is well-developed and well-groomed. He is obese. He is not ill-appearing. HENT:      Head: Normocephalic and atraumatic. Right Ear: Tympanic membrane, ear canal and external ear normal. No decreased hearing noted. Left Ear: Tympanic membrane, ear canal and external ear normal. No decreased hearing noted.       Nose: Nose normal.      Mouth/Throat:      Lips: Pink. Mouth: Mucous membranes are moist.      Pharynx: Oropharynx is clear. Uvula midline. Eyes:      Conjunctiva/sclera: Conjunctivae normal.      Pupils: Pupils are equal, round, and reactive to light. Neck:      Thyroid: No thyroid mass. Trachea: Trachea normal.   Cardiovascular:      Rate and Rhythm: Normal rate and regular rhythm. Pulses: Normal pulses. Carotid pulses are 2+ on the right side and 2+ on the left side. Radial pulses are 2+ on the right side and 2+ on the left side. Dorsalis pedis pulses are 2+ on the right side and 2+ on the left side. Posterior tibial pulses are 2+ on the right side and 2+ on the left side. Heart sounds: Normal heart sounds, S1 normal and S2 normal. No murmur heard. Pulmonary:      Effort: Pulmonary effort is normal.      Breath sounds: Normal breath sounds. No decreased breath sounds or wheezing. Abdominal:      General: Bowel sounds are normal.      Palpations: Abdomen is soft. Tenderness: There is no abdominal tenderness. Musculoskeletal:      Cervical back: Normal range of motion. Lumbar back: Tenderness present. Decreased range of motion. Right lower leg: No edema. Left lower leg: No edema. Lymphadenopathy:      Cervical: No cervical adenopathy. Skin:     General: Skin is warm and dry. Capillary Refill: Capillary refill takes less than 2 seconds. Coloration: Skin is not pale. Findings: Wound present. No rash. Comments: LLE groin wound   Neurological:      Mental Status: He is alert and oriented to person, place, and time. GCS: GCS eye subscore is 4. GCS verbal subscore is 5. GCS motor subscore is 6. Motor: Motor function is intact. Coordination: Coordination is intact.    Psychiatric:         Attention and Perception: Attention normal.         Mood and Affect: Mood normal.         Speech: Speech normal. Behavior: Behavior normal. Behavior is cooperative. Thought Content: Thought content normal.         Cognition and Memory: Cognition normal.         Judgment: Judgment normal.                  An electronic signature was used to authenticate this note.     --MINA Molina NP

## 2021-07-23 ENCOUNTER — TELEPHONE (OUTPATIENT)
Dept: FAMILY MEDICINE CLINIC | Age: 36
End: 2021-07-23

## 2021-07-23 RX ORDER — TRAMADOL HYDROCHLORIDE 50 MG/1
50 TABLET ORAL EVERY 8 HOURS PRN
Qty: 90 TABLET | Refills: 0 | Status: SHIPPED | OUTPATIENT
Start: 2021-07-23 | End: 2021-08-17 | Stop reason: SDUPTHER

## 2021-07-23 RX ORDER — PANTOPRAZOLE SODIUM 20 MG/1
20 TABLET, DELAYED RELEASE ORAL 2 TIMES DAILY
Qty: 60 TABLET | Refills: 5 | Status: SHIPPED | OUTPATIENT
Start: 2021-07-23 | End: 2021-08-17 | Stop reason: SDUPTHER

## 2021-07-23 NOTE — TELEPHONE ENCOUNTER
----- Message from Odell Elizabeth sent at 7/23/2021 11:05 AM EDT -----  Subject: Refill Request    QUESTIONS  Name of Medication? pantoprazole (PROTONIX) 20 MG tablet  Patient-reported dosage and instructions? 1 in morning 1 in evening   How many days do you have left? 0  Preferred Pharmacy? [id number] [address] [facility] phone number (if available)? [phone]  Additional Information for Provider? supposed to be 90 tablets for 30 day   supply   ---------------------------------------------------------------------------  --------------  CALL BACK INFO  What is the best way for the office to contact you? OK to leave message on   voicemail  Preferred Call Back Phone Number?  [phone]

## 2021-08-01 ASSESSMENT — ENCOUNTER SYMPTOMS
BACK PAIN: 1
DIARRHEA: 0
SINUS PAIN: 0
SHORTNESS OF BREATH: 0
RHINORRHEA: 0
PHOTOPHOBIA: 0
ABDOMINAL DISTENTION: 0
COUGH: 0
ABDOMINAL PAIN: 0
CONSTIPATION: 0
NAUSEA: 0

## 2021-08-17 DIAGNOSIS — R20.2 NUMBNESS AND TINGLING OF LEFT LOWER EXTREMITY: ICD-10-CM

## 2021-08-17 DIAGNOSIS — K21.9 GASTROESOPHAGEAL REFLUX DISEASE, UNSPECIFIED WHETHER ESOPHAGITIS PRESENT: ICD-10-CM

## 2021-08-17 DIAGNOSIS — R11.15 EMESIS, PERSISTENT: ICD-10-CM

## 2021-08-17 DIAGNOSIS — M19.90 ARTHRITIS: ICD-10-CM

## 2021-08-17 DIAGNOSIS — M54.42 CHRONIC BILATERAL LOW BACK PAIN WITH LEFT-SIDED SCIATICA: ICD-10-CM

## 2021-08-17 DIAGNOSIS — R20.0 NUMBNESS AND TINGLING OF LEFT LOWER EXTREMITY: ICD-10-CM

## 2021-08-17 DIAGNOSIS — R52 RADIATING PAIN: ICD-10-CM

## 2021-08-17 DIAGNOSIS — G89.29 CHRONIC BILATERAL LOW BACK PAIN WITH LEFT-SIDED SCIATICA: ICD-10-CM

## 2021-08-18 RX ORDER — PANTOPRAZOLE SODIUM 20 MG/1
20 TABLET, DELAYED RELEASE ORAL 2 TIMES DAILY
Qty: 60 TABLET | Refills: 5 | Status: SHIPPED | OUTPATIENT
Start: 2021-08-18 | End: 2022-02-15

## 2021-08-18 RX ORDER — TRAMADOL HYDROCHLORIDE 50 MG/1
50 TABLET ORAL EVERY 8 HOURS PRN
Qty: 90 TABLET | Refills: 0 | Status: SHIPPED | OUTPATIENT
Start: 2021-08-18 | End: 2021-09-20 | Stop reason: SDUPTHER

## 2021-08-18 NOTE — TELEPHONE ENCOUNTER
Last visit: 07/21/2021  Last Med refill: 07/23/2021  Does patient have enough medication for 72 hours: Yes    Next Visit Date:  Future Appointments   Date Time Provider Yari Bazan   10/28/2021  2:00 PM MINA Roberts NP [facility]   1/21/2022  8:00 AM MINA Roberts NP [facility] [address] [facility]       Health Maintenance   Topic Date Due    HIV screen  09/15/2021 (Originally 9/11/1998)    Pneumococcal 0-64 years Vaccine (1 of 2 - PPSV23) 06/02/2022 (Originally 9/11/1989)    COVID-19 Vaccine (1) 06/02/2022 (Originally 9/11/1995)    Flu vaccine (1) 09/01/2021    A1C test (Diabetic or Prediabetic)  07/21/2022    DTaP/Tdap/Td vaccine (2 - Td or Tdap) 02/19/2024    Hepatitis A vaccine  Aged Out    Hepatitis B vaccine  Aged Out    Hib vaccine  Aged Out    Meningococcal (ACWY) vaccine  Aged Out    Varicella vaccine  Discontinued    Hepatitis C screen  Discontinued       Hemoglobin A1C (%)   Date Value   07/21/2021 6.0             ( goal A1C is < 7)   No results found for: LABMICR  LDL Cholesterol (mg/dL)   Date Value   07/21/2021 122   03/03/2017 100       (goal LDL is <100)   AST (U/L)   Date Value   07/21/2021 29     ALT (U/L)   Date Value   07/21/2021 34     BUN (mg/dL)   Date Value   07/21/2021 16     BP Readings from Last 3 Encounters:   07/21/21 126/84   01/28/21 (!) 144/100   09/15/20 120/80          (goal 120/80)    All Future Testing planned in [facility]  Lab Frequency Next Occurrence   COVID-19 Once 02/05/2021   COVID-19 Once 02/22/2021               Patient Active Problem List:     Tobacco user     Anxiety state     Sciatica     Lipoma of back     Mild persistent asthma without complication     Synovial cyst of left popliteal space     Chronic back pain     Closed fracture of tuft of distal phalanx of finger     Injury due to fall     Radiating pain     Morbidly obese (HCC)

## 2021-08-19 ENCOUNTER — TELEPHONE (OUTPATIENT)
Dept: FAMILY MEDICINE CLINIC | Age: 36
End: 2021-08-19

## 2021-08-19 NOTE — TELEPHONE ENCOUNTER
Patient is ready to increase to [facility] 0.5 mg     Orders Pended for editing and approval.

## 2021-08-20 RX ORDER — SEMAGLUTIDE 0.5 MG/.5ML
0.5 INJECTION, SOLUTION SUBCUTANEOUS
Qty: 2 ML | Refills: 0 | Status: SHIPPED | OUTPATIENT
Start: 2021-08-20 | End: 2022-02-25

## 2021-08-25 DIAGNOSIS — L02.214 ABSCESS OF LEFT GROIN: ICD-10-CM

## 2021-08-26 NOTE — TELEPHONE ENCOUNTER
Last visit: 07/21/2021  Last Med refill: 05/03/2021  Does patient have enough medication for 72 hours: Yes    Next Visit Date:  Future Appointments   Date Time Provider Yari Bazan   10/28/2021  2:00 PM MINA Collier NP  [facility]   1/21/2022  8:00 AM MINA Collier NP [facility]       Health Maintenance   Topic Date Due    HIV screen  09/15/2021 (Originally 9/11/1998)    Pneumococcal 0-64 years Vaccine (1 of 2 - PPSV23) 06/02/2022 (Originally 9/11/1989)    COVID-19 Vaccine (1) 06/02/2022 (Originally 9/11/1995)    Flu vaccine (1) 09/01/2021    A1C test (Diabetic or Prediabetic)  07/21/2022    DTaP/Tdap/Td vaccine (2 - Td or Tdap) 02/19/2024    Hepatitis A vaccine  Aged Out    Hepatitis B vaccine  Aged Out    Hib vaccine  Aged Out    Meningococcal (ACWY) vaccine  Aged Out    Varicella vaccine  Discontinued    Hepatitis C screen  Discontinued       Hemoglobin A1C (%)   Date Value   07/21/2021 6.0             ( goal A1C is < 7)   No results found for: LABMICR  LDL Cholesterol (mg/dL)   Date Value   07/21/2021 122   03/03/2017 100       (goal LDL is <100)   AST (U/L)   Date Value   07/21/2021 29     ALT (U/L)   Date Value   07/21/2021 34     BUN (mg/dL)   Date Value   07/21/2021 16     BP Readings from Last 3 Encounters:   07/21/21 126/84   01/28/21 (!) 144/100   09/15/20 120/80          (goal 120/80)    All Future Testing planned in [facility]  Lab Frequency Next Occurrence   COVID-19 Once 02/05/2021   COVID-19 Once 02/22/2021               Patient Active Problem List:     Tobacco user     Anxiety state     Sciatica     Lipoma of back     Mild persistent asthma without complication     Synovial cyst of left popliteal space     Chronic back pain     Closed fracture of tuft of distal phalanx of finger     Injury due to fall     Radiating pain     Morbidly obese (HCC)

## 2021-09-20 DIAGNOSIS — R52 RADIATING PAIN: ICD-10-CM

## 2021-09-20 DIAGNOSIS — R20.0 NUMBNESS AND TINGLING OF LEFT LOWER EXTREMITY: ICD-10-CM

## 2021-09-20 DIAGNOSIS — M54.42 CHRONIC BILATERAL LOW BACK PAIN WITH LEFT-SIDED SCIATICA: ICD-10-CM

## 2021-09-20 DIAGNOSIS — M19.90 ARTHRITIS: ICD-10-CM

## 2021-09-20 DIAGNOSIS — G89.29 CHRONIC BILATERAL LOW BACK PAIN WITH LEFT-SIDED SCIATICA: ICD-10-CM

## 2021-09-20 DIAGNOSIS — R20.2 NUMBNESS AND TINGLING OF LEFT LOWER EXTREMITY: ICD-10-CM

## 2021-09-20 NOTE — TELEPHONE ENCOUNTER
Last visit: 7/21/21  Last Med refill:8/18/21  Does patient have enough medication for 72 hours: No:     Next Visit Date:  Future Appointments   Date Time Provider Yari Bazan   10/28/2021  2:00 PM MINA Linton NP  [facility]   1/21/2022  8:00 AM MINA Linton NP [facility]       Health Maintenance   Topic Date Due    HIV screen  Never done    Flu vaccine (1) Never done    Pneumococcal 0-64 years Vaccine (1 of 2 - PPSV23) 06/02/2022 (Originally 9/11/1989)    COVID-19 Vaccine (1) 06/02/2022 (Originally 9/11/1995)    A1C test (Diabetic or Prediabetic)  07/21/2022    DTaP/Tdap/Td vaccine (2 - Td or Tdap) 02/19/2024    Hepatitis A vaccine  Aged Out    Hepatitis B vaccine  Aged Out    Hib vaccine  Aged Out    Meningococcal (ACWY) vaccine  Aged Out    Varicella vaccine  Discontinued    Hepatitis C screen  Discontinued       Hemoglobin A1C (%)   Date Value   07/21/2021 6.0             ( goal A1C is < 7)   No results found for: LABMICR  LDL Cholesterol (mg/dL)   Date Value   07/21/2021 122   03/03/2017 100       (goal LDL is <100)   AST (U/L)   Date Value   07/21/2021 29     ALT (U/L)   Date Value   07/21/2021 34     BUN (mg/dL)   Date Value   07/21/2021 16     BP Readings from Last 3 Encounters:   07/21/21 126/84   01/28/21 (!) 144/100   09/15/20 120/80          (goal 120/80)    All Future Testing planned in [facility]  Lab Frequency Next Occurrence   COVID-19 Once 02/05/2021   COVID-19 Once 02/22/2021               Patient Active Problem List:     Tobacco user     Anxiety state     Sciatica     Lipoma of back     Mild persistent asthma without complication     Synovial cyst of left popliteal space     Chronic back pain     Closed fracture of tuft of distal phalanx of finger     Injury due to fall     Radiating pain     Morbidly obese (HCC)

## 2021-09-21 RX ORDER — TRAMADOL HYDROCHLORIDE 50 MG/1
50 TABLET ORAL EVERY 8 HOURS PRN
Qty: 90 TABLET | Refills: 0 | Status: SHIPPED | OUTPATIENT
Start: 2021-09-21 | End: 2021-10-19 | Stop reason: SDUPTHER

## 2021-10-19 DIAGNOSIS — R20.0 NUMBNESS AND TINGLING OF LEFT LOWER EXTREMITY: ICD-10-CM

## 2021-10-19 DIAGNOSIS — R20.2 NUMBNESS AND TINGLING OF LEFT LOWER EXTREMITY: ICD-10-CM

## 2021-10-19 DIAGNOSIS — R52 RADIATING PAIN: ICD-10-CM

## 2021-10-19 DIAGNOSIS — L02.214 ABSCESS OF LEFT GROIN: ICD-10-CM

## 2021-10-19 DIAGNOSIS — M54.42 CHRONIC BILATERAL LOW BACK PAIN WITH LEFT-SIDED SCIATICA: ICD-10-CM

## 2021-10-19 DIAGNOSIS — G89.29 CHRONIC BILATERAL LOW BACK PAIN WITH LEFT-SIDED SCIATICA: ICD-10-CM

## 2021-10-19 DIAGNOSIS — M19.90 ARTHRITIS: ICD-10-CM

## 2021-10-20 RX ORDER — TRAMADOL HYDROCHLORIDE 50 MG/1
50 TABLET ORAL EVERY 8 HOURS PRN
Qty: 90 TABLET | Refills: 0 | Status: SHIPPED | OUTPATIENT
Start: 2021-10-20 | End: 2021-11-17 | Stop reason: CLARIF

## 2021-10-20 NOTE — TELEPHONE ENCOUNTER
Last visit: 7/21/21  Last Med refill: 9/21/21  Does patient have enough medication for 72 hours: No    Next Visit Date:  Future Appointments   Date Time Provider Yari Bazan   10/28/2021  2:00 PM MINA Loyola NP [facility]   1/21/2022  8:00 AM MINA Loyola NP       Health Maintenance   Topic Date Due    HIV screen  Never done    Flu vaccine (1) Never done    Pneumococcal 0-64 years Vaccine (1 of 2 - PPSV23) 06/02/2022 (Originally 9/11/1989)    COVID-19 Vaccine (1) 06/02/2022 (Originally 9/11/1995)    A1C test (Diabetic or Prediabetic)  07/21/2022    DTaP/Tdap/Td vaccine (2 - Td or Tdap) 02/19/2024    Hepatitis A vaccine  Aged Out    Hepatitis B vaccine  Aged Out    Hib vaccine  Aged Out    Meningococcal (ACWY) vaccine  Aged Out    Varicella vaccine  Discontinued    Hepatitis C screen  Discontinued       Hemoglobin A1C (%)   Date Value   07/21/2021 6.0             ( goal A1C is < 7)   No results found for: LABMICR  LDL Cholesterol (mg/dL)   Date Value   07/21/2021 122   03/03/2017 100       (goal LDL is <100)   AST (U/L)   Date Value   07/21/2021 29     ALT (U/L)   Date Value   07/21/2021 34     BUN (mg/dL)   Date Value   07/21/2021 16     BP Readings from Last 3 Encounters:   07/21/21 126/84   01/28/21 (!) 144/100   09/15/20 120/80          (goal 120/80)    All Future Testing planned in [facility]  Lab Frequency Next Occurrence   COVID-19 Once 02/05/2021   COVID-19 Once 02/22/2021               Patient Active Problem List:     Tobacco user     Anxiety state     Sciatica     Lipoma of back     Mild persistent asthma without complication     Synovial cyst of left popliteal space     Chronic back pain     Closed fracture of tuft of distal phalanx of finger     Injury due to fall     Radiating pain     Morbidly obese (HCC)

## 2021-10-27 ENCOUNTER — TELEPHONE (OUTPATIENT)
Dept: FAMILY MEDICINE CLINIC | Age: 36
End: 2021-10-27

## 2021-10-27 NOTE — TELEPHONE ENCOUNTER
[facility] ([facility]) ED Follow up Call    Reason for ED visit:  Marilynn Mendoza , this is Lizy Cerda from Dr. Gabi Herman office, just calling to see how you are doing after your recent ED visit. Did you receive discharge instructions? Yes  Do you understand the discharge instructions? Yes  Did the ED give you any new prescriptions? Yes  Were you able to fill your prescriptions? Yes      Do you have one of our red, yellow and green  Zone sheets that help you to determine when you should go to the ED? Yes      Do you need or want to make a follow up appt with your PCP? Yes    Do you have any further needs in the home i.e. Equipment? Yes, Patient refused pain medication, due to the contract with our office. Patient asking for pain medication.          FU appts/Provider:    Future Appointments   Date Time Provider Yari Bazan   10/28/2021  2:15 PM MINA Hancock NP [facility]   1/21/2022  8:00 AM MINA Hancock NP

## 2021-10-28 ENCOUNTER — TELEMEDICINE (OUTPATIENT)
Dept: FAMILY MEDICINE CLINIC | Age: 36
End: 2021-10-28
Payer: MEDICARE

## 2021-10-28 DIAGNOSIS — R52 RADIATING PAIN: ICD-10-CM

## 2021-10-28 DIAGNOSIS — G89.29 CHRONIC BILATERAL LOW BACK PAIN WITH LEFT-SIDED SCIATICA: Primary | ICD-10-CM

## 2021-10-28 DIAGNOSIS — R20.0 NUMBNESS AND TINGLING OF LEFT LOWER EXTREMITY: ICD-10-CM

## 2021-10-28 DIAGNOSIS — M54.42 CHRONIC BILATERAL LOW BACK PAIN WITH LEFT-SIDED SCIATICA: Primary | ICD-10-CM

## 2021-10-28 DIAGNOSIS — R20.2 NUMBNESS AND TINGLING OF LEFT LOWER EXTREMITY: ICD-10-CM

## 2021-10-28 PROCEDURE — 99214 OFFICE O/P EST MOD 30 MIN: CPT | Performed by: NURSE PRACTITIONER

## 2021-10-28 RX ORDER — HYDROCODONE BITARTRATE AND ACETAMINOPHEN 5; 325 MG/1; MG/1
1 TABLET ORAL EVERY 8 HOURS PRN
Qty: 9 TABLET | Refills: 0 | Status: SHIPPED | OUTPATIENT
Start: 2021-10-28 | End: 2021-10-31

## 2021-10-28 NOTE — PROGRESS NOTES
Yung Rodriguez (:  1985) is a 43 y.o. male,Established patient, here for evaluation of the following chief complaint(s):  ED Follow-up         ASSESSMENT/PLAN:  1. Chronic bilateral low back pain with left-sided sciatica  -     HYDROcodone-acetaminophen (NORCO) 5-325 MG per tablet; Take 1 tablet by mouth every 8 hours as needed for Pain for up to 3 days. Intended supply: 7 days. Take lowest dose possible to manage pain, Disp-9 tablet, R-0Normal  2. Radiating pain  -     HYDROcodone-acetaminophen (NORCO) 5-325 MG per tablet; Take 1 tablet by mouth every 8 hours as needed for Pain for up to 3 days. Intended supply: 7 days. Take lowest dose possible to manage pain, Disp-9 tablet, R-0Normal  3. Numbness and tingling of left lower extremity  -     HYDROcodone-acetaminophen (NORCO) 5-325 MG per tablet; Take 1 tablet by mouth every 8 hours as needed for Pain for up to 3 days. Intended supply: 7 days. Take lowest dose possible to manage pain, Disp-9 tablet, R-0Normal      No follow-ups on file. Subjective   SUBJECTIVE/OBJECTIVE:  Ed follow up. [facility]. Punched a wall and broke his hand-left. Xray and CT confirmed fracture. Has follow up with Dr. Eliecer Casper tomorrow. They did put his hand in a splint and a sling. He reports its very swollen. Did see Dr. Martha Leon and received a couple injections- didn't work. suggesteed a different procedure but he declined. Reports tramadol not helping his pain in general anymore. Just helps him go to sleep. Ava Palma tried him on gabapentin in the past but stopped taking after 3-4 weeks bc he said he didn't work. Saw [facility] in dec. 2019 pain mangement. Didn't do anything for him except tell him he needs Pt and he isnt willing to do that. Will try and find a pain management to see him for a second opinnion      Review of Systems       Objective   Physical Exam             An electronic signature was used to authenticate this note.     --Donald Perry, MINA - NP

## 2021-11-03 ENCOUNTER — PATIENT MESSAGE (OUTPATIENT)
Dept: FAMILY MEDICINE CLINIC | Age: 36
End: 2021-11-03

## 2021-11-03 DIAGNOSIS — G89.29 CHRONIC BILATERAL LOW BACK PAIN WITH LEFT-SIDED SCIATICA: Primary | ICD-10-CM

## 2021-11-03 DIAGNOSIS — M54.42 CHRONIC BILATERAL LOW BACK PAIN WITH LEFT-SIDED SCIATICA: Primary | ICD-10-CM

## 2021-11-03 DIAGNOSIS — R52 RADIATING PAIN: ICD-10-CM

## 2021-11-03 NOTE — TELEPHONE ENCOUNTER
From: Israel Domingo  To: MINA Harris NP  Sent: 11/3/2021 9:07 AM EDT  Subject: Visit Follow-Up Question    Good morning,     He wanted me to see if you had looked into doing the extended release tramadol instead of the immediate to see if that provides better pain control for a longer period of time.

## 2021-11-16 DIAGNOSIS — R20.0 NUMBNESS AND TINGLING OF LEFT LOWER EXTREMITY: ICD-10-CM

## 2021-11-16 DIAGNOSIS — M54.42 CHRONIC BILATERAL LOW BACK PAIN WITH LEFT-SIDED SCIATICA: ICD-10-CM

## 2021-11-16 DIAGNOSIS — R52 RADIATING PAIN: ICD-10-CM

## 2021-11-16 DIAGNOSIS — R20.2 NUMBNESS AND TINGLING OF LEFT LOWER EXTREMITY: ICD-10-CM

## 2021-11-16 DIAGNOSIS — G89.29 CHRONIC BILATERAL LOW BACK PAIN WITH LEFT-SIDED SCIATICA: ICD-10-CM

## 2021-11-16 DIAGNOSIS — M19.90 ARTHRITIS: ICD-10-CM

## 2021-11-16 RX ORDER — TRAMADOL HYDROCHLORIDE 50 MG/1
50 TABLET ORAL EVERY 8 HOURS PRN
Qty: 90 TABLET | Refills: 0 | Status: CANCELLED | OUTPATIENT
Start: 2021-11-16 | End: 2022-11-16

## 2021-11-16 NOTE — TELEPHONE ENCOUNTER
Last visit: 10-  Last Med refill: 10-  Does patient have enough medication for 72 hours: No:     Next Visit Date:  Future Appointments   Date Time Provider Yari Bazan   1/21/2022  8:00 AM MINA Samaniego NP [facility] [address] Maintenance   Topic Date Due    Pneumococcal 0-64 years Vaccine (1 of 2 - PPSV23) 06/02/2022 (Originally 9/11/1989)    COVID-19 Vaccine (1) 06/02/2022 (Originally 9/11/1995)    Flu vaccine (1) 10/28/2022 (Originally 9/1/2021)    A1C test (Diabetic or Prediabetic)  07/21/2022    DTaP/Tdap/Td vaccine (2 - Td or Tdap) 02/19/2024    Hepatitis A vaccine  Aged Out    Hepatitis B vaccine  Aged Out    Hib vaccine  Aged Out    Meningococcal (ACWY) vaccine  Aged Out    Varicella vaccine  Discontinued    Hepatitis C screen  Discontinued    HIV screen  Discontinued       Hemoglobin A1C (%)   Date Value   07/21/2021 6.0             ( goal A1C is < 7)   No results found for: LABMICR  LDL Cholesterol (mg/dL)   Date Value   07/21/2021 122   03/03/2017 100       (goal LDL is <100)   AST (U/L)   Date Value   07/21/2021 29     ALT (U/L)   Date Value   07/21/2021 34     BUN (mg/dL)   Date Value   07/21/2021 16     BP Readings from Last 3 Encounters:   07/21/21 126/84   01/28/21 (!) 144/100   09/15/20 120/80          (goal 120/80)    All Future Testing planned in [facility]  Lab Frequency Next Occurrence   COVID-19 Once 02/05/2021   COVID-19 Once 02/22/2021               Patient Active Problem List:     Tobacco user     Anxiety state     Sciatica     Lipoma of back     Mild persistent asthma without complication     Synovial cyst of left popliteal space     Chronic back pain     Closed fracture of tuft of distal phalanx of finger     Injury due to fall     Radiating pain     Morbidly obese ([address]

## 2021-11-17 RX ORDER — TRAMADOL HYDROCHLORIDE 100 MG/1
100 CAPSULE ORAL EVERY 12 HOURS PRN
Qty: 60 CAPSULE | Refills: 0 | Status: SHIPPED | OUTPATIENT
Start: 2021-11-17 | End: 2021-11-22

## 2021-11-18 ENCOUNTER — TELEPHONE (OUTPATIENT)
Dept: FAMILY MEDICINE CLINIC | Age: 36
End: 2021-11-18

## 2021-11-18 NOTE — TELEPHONE ENCOUNTER
----- Message from Elizabeth Do sent at 11/17/2021  1:34 PM EST -----  Subject: Medication Problem    QUESTIONS  Name of Medication? traMADol (ULTRAM ER) 100 MG extended release tablet  Patient-reported dosage and instructions? 100 MG extended release tablet,   Take 1 tablet by mouth every 12 hours as needed for Pain for up to 30   days. What question or problem do you have with the medication? Pt. needs a   prior auth on this medication  Preferred Pharmacy? [address] [place] [address], [address] [phone]  Pharmacy phone number (if available)? [phone]  Additional Information for Provider?   ---------------------------------------------------------------------------  --------------  [address]  What is the best way for the office to contact you? OK to leave message on   voicemail  Preferred Call Back Phone Number? [phone]  ---------------------------------------------------------------------------  --------------  SCRIPT ANSWERS  Relationship to Patient? Other  Representative Name? Dany Mckinnon  Is the [place] [employer] on the appropriate HIPAA document in Epic?  Yes

## 2021-11-22 DIAGNOSIS — M19.90 ARTHRITIS: ICD-10-CM

## 2021-11-22 DIAGNOSIS — G89.29 CHRONIC BILATERAL LOW BACK PAIN WITH LEFT-SIDED SCIATICA: ICD-10-CM

## 2021-11-22 DIAGNOSIS — M54.42 CHRONIC BILATERAL LOW BACK PAIN WITH LEFT-SIDED SCIATICA: ICD-10-CM

## 2021-11-22 DIAGNOSIS — R20.2 NUMBNESS AND TINGLING OF LEFT LOWER EXTREMITY: ICD-10-CM

## 2021-11-22 DIAGNOSIS — R52 RADIATING PAIN: ICD-10-CM

## 2021-11-22 DIAGNOSIS — R20.0 NUMBNESS AND TINGLING OF LEFT LOWER EXTREMITY: ICD-10-CM

## 2021-11-22 RX ORDER — TRAMADOL HYDROCHLORIDE 50 MG/1
50 TABLET ORAL EVERY 8 HOURS PRN
Qty: 90 TABLET | Refills: 0 | Status: SHIPPED | OUTPATIENT
Start: 2021-11-22 | End: 2021-12-16 | Stop reason: SDUPTHER

## 2021-11-22 RX ORDER — TRAMADOL HYDROCHLORIDE 50 MG/1
50 TABLET ORAL 3 TIMES DAILY
Qty: 90 TABLET | Refills: 0 | OUTPATIENT
Start: 2021-11-22 | End: 2021-12-22

## 2021-11-22 RX ORDER — TRAMADOL HYDROCHLORIDE 100 MG/1
100 CAPSULE ORAL EVERY 12 HOURS PRN
Qty: 60 CAPSULE | Refills: 0 | Status: CANCELLED | OUTPATIENT
Start: 2021-11-22 | End: 2021-12-22

## 2021-11-22 NOTE — TELEPHONE ENCOUNTER
Last visit:10/28/2021  Last Med refill:  10/20/2021-50mg  Does patient have enough medication for 72 hours: No:     Next Visit Date:  Future Appointments   Date Time Provider Yari Bazan   1/21/2022  8:00 AM MINA Betancourt - JEREMY [address] Maintenance   Topic Date Due    Pneumococcal 0-64 years Vaccine (1 of 2 - PPSV23) 06/02/2022 (Originally 9/11/1989)    COVID-19 Vaccine (1) 06/02/2022 (Originally 9/11/1988)    Flu vaccine (1) 10/28/2022 (Originally 9/1/2021)    A1C test (Diabetic or Prediabetic)  07/21/2022    DTaP/Tdap/Td vaccine (2 - Td or Tdap) 02/19/2024    Hepatitis A vaccine  Aged Out    Hepatitis B vaccine  Aged Out    Hib vaccine  Aged Out    Meningococcal (ACWY) vaccine  Aged Out    Varicella vaccine  Discontinued    Hepatitis C screen  Discontinued    HIV screen  Discontinued       Hemoglobin A1C (%)   Date Value   07/21/2021 6.0             ( goal A1C is < 7)   No results found for: LABMICR  LDL Cholesterol (mg/dL)   Date Value   07/21/2021 122   03/03/2017 100       (goal LDL is <100)   AST (U/L)   Date Value   07/21/2021 29     ALT (U/L)   Date Value   07/21/2021 34     BUN (mg/dL)   Date Value   07/21/2021 16     BP Readings from Last 3 Encounters:   07/21/21 126/84   01/28/21 (!) 144/100   09/15/20 120/80          (goal 120/80)    All Future Testing planned in [facility]  Lab Frequency Next Occurrence   COVID-19 Once 02/05/2021   COVID-19 Once 02/22/2021               Patient Active Problem List:     Tobacco user     Anxiety state     Sciatica     Lipoma of back     Mild persistent asthma without complication     Synovial cyst of left popliteal space     Chronic back pain     Closed fracture of tuft of distal phalanx of finger     Injury due to fall     Radiating pain     Morbidly obese ([address] hx fuchs dystrophy

## 2021-11-22 NOTE — TELEPHONE ENCOUNTER
Patient requesting to drop back to the 50 mg Tramadol 3x daily. This relieved sx's the best and insurance will not give him a difficult time with coverage. Rx: R/A Derik.   Patient is out of medication d/t insurance

## 2021-12-16 DIAGNOSIS — G89.29 CHRONIC BILATERAL LOW BACK PAIN WITH LEFT-SIDED SCIATICA: ICD-10-CM

## 2021-12-16 DIAGNOSIS — R52 RADIATING PAIN: ICD-10-CM

## 2021-12-16 DIAGNOSIS — M54.42 CHRONIC BILATERAL LOW BACK PAIN WITH LEFT-SIDED SCIATICA: ICD-10-CM

## 2021-12-17 RX ORDER — TRAMADOL HYDROCHLORIDE 50 MG/1
50 TABLET ORAL EVERY 8 HOURS PRN
Qty: 90 TABLET | Refills: 0 | Status: SHIPPED | OUTPATIENT
Start: 2021-12-17 | End: 2022-01-13 | Stop reason: SDUPTHER

## 2022-01-13 DIAGNOSIS — M54.42 CHRONIC BILATERAL LOW BACK PAIN WITH LEFT-SIDED SCIATICA: ICD-10-CM

## 2022-01-13 DIAGNOSIS — R52 RADIATING PAIN: ICD-10-CM

## 2022-01-13 DIAGNOSIS — G89.29 CHRONIC BILATERAL LOW BACK PAIN WITH LEFT-SIDED SCIATICA: ICD-10-CM

## 2022-01-14 RX ORDER — TRAMADOL HYDROCHLORIDE 50 MG/1
50 TABLET ORAL EVERY 8 HOURS PRN
Qty: 90 TABLET | Refills: 0 | Status: SHIPPED | OUTPATIENT
Start: 2022-01-14 | End: 2022-02-15 | Stop reason: SDUPTHER

## 2022-01-14 NOTE — TELEPHONE ENCOUNTER
Last visit: 10/28/21  Last Med refill: 12/16/21    Next Visit Date:  Future Appointments   Date Time Provider Yari Bazan   1/21/2022  8:15 AM MINA Donahue NP [facility] [address] Maintenance   Topic Date Due    Pneumococcal 0-64 years Vaccine (1 of 2 - PPSV23) 06/02/2022 (Originally 9/11/1989)    COVID-19 Vaccine (1) 06/02/2022 (Originally 9/11/1988)    Flu vaccine (1) 10/28/2022 (Originally 9/1/2021)    A1C test (Diabetic or Prediabetic)  07/21/2022    Depression Screen  07/21/2022    DTaP/Tdap/Td vaccine (2 - Td or Tdap) 02/19/2024    Hepatitis A vaccine  Aged Out    Hepatitis B vaccine  Aged Out    Hib vaccine  Aged Out    Meningococcal (ACWY) vaccine  Aged Out    Varicella vaccine  Discontinued    Hepatitis C screen  Discontinued    HIV screen  Discontinued       Hemoglobin A1C (%)   Date Value   07/21/2021 6.0             ( goal A1C is < 7)   No results found for: LABMICR  LDL Cholesterol (mg/dL)   Date Value   07/21/2021 122   03/03/2017 100       (goal LDL is <100)   AST (U/L)   Date Value   07/21/2021 29     ALT (U/L)   Date Value   07/21/2021 34     BUN (mg/dL)   Date Value   07/21/2021 16     BP Readings from Last 3 Encounters:   07/21/21 126/84   01/28/21 (!) 144/100   09/15/20 120/80          (goal 120/80)    All Future Testing planned in [facility]  Lab Frequency Next Occurrence   COVID-19 Once 02/05/2021   COVID-19 Once 02/22/2021               Patient Active Problem List:     Tobacco user     Anxiety state     Sciatica     Lipoma of back     Mild persistent asthma without complication     Synovial cyst of left popliteal space     Chronic back pain     Closed fracture of tuft of distal phalanx of finger     Injury due to fall     Radiating pain     Morbidly obese (HCC)

## 2022-02-11 DIAGNOSIS — R52 RADIATING PAIN: ICD-10-CM

## 2022-02-11 DIAGNOSIS — M54.42 CHRONIC BILATERAL LOW BACK PAIN WITH LEFT-SIDED SCIATICA: ICD-10-CM

## 2022-02-11 DIAGNOSIS — G89.29 CHRONIC BILATERAL LOW BACK PAIN WITH LEFT-SIDED SCIATICA: ICD-10-CM

## 2022-02-14 DIAGNOSIS — R11.15 EMESIS, PERSISTENT: ICD-10-CM

## 2022-02-14 DIAGNOSIS — K21.9 GASTROESOPHAGEAL REFLUX DISEASE, UNSPECIFIED WHETHER ESOPHAGITIS PRESENT: ICD-10-CM

## 2022-02-14 NOTE — TELEPHONE ENCOUNTER
Last visit: 07/21/21  Last Med refill: 01/14/22  Does patient have enough medication for 72 hours: Yes.     Next Visit Date:  Future Appointments   Date Time Provider Yari Bazan   2/25/2022  8:45 AM MINA Tamez NP [address] Maintenance   Topic Date Due    Pneumococcal 0-64 years Vaccine (1 of 2 - PPSV23) 06/02/2022 (Originally 9/11/1989)    COVID-19 Vaccine (1) 06/02/2022 (Originally 9/11/1988)    Flu vaccine (1) 10/28/2022 (Originally 9/1/2021)    A1C test (Diabetic or Prediabetic)  07/21/2022    Depression Screen  07/21/2022    DTaP/Tdap/Td vaccine (2 - Td or Tdap) 02/19/2024    Hepatitis A vaccine  Aged Out    Hepatitis B vaccine  Aged Out    Hib vaccine  Aged Out    Meningococcal (ACWY) vaccine  Aged Out    Varicella vaccine  Discontinued    Hepatitis C screen  Discontinued    HIV screen  Discontinued       Hemoglobin A1C (%)   Date Value   07/21/2021 6.0             ( goal A1C is < 7)   No results found for: LABMICR  LDL Cholesterol (mg/dL)   Date Value   07/21/2021 122   03/03/2017 100       (goal LDL is <100)   AST (U/L)   Date Value   07/21/2021 29     ALT (U/L)   Date Value   07/21/2021 34     BUN (mg/dL)   Date Value   07/21/2021 16     BP Readings from Last 3 Encounters:   07/21/21 126/84   01/28/21 (!) 144/100   09/15/20 120/80          (goal 120/80)    All Future Testing planned in [facility]  Lab Frequency Next Occurrence   COVID-19 Once 02/22/2021               Patient Active Problem List:     Tobacco user     Anxiety state     Sciatica     Lipoma of back     Mild persistent asthma without complication     Synovial cyst of left popliteal space     Chronic back pain     Closed fracture of tuft of distal phalanx of finger     Injury due to fall     Radiating pain     Morbidly obese ([address]

## 2022-02-14 NOTE — TELEPHONE ENCOUNTER
Last visit:  07/21/21  Last Med refill: 08/18/21  Does patient have enough medication for 72 hours: yes.     Next Visit Date:  Future Appointments   Date Time Provider Yari Bazan   2/25/2022  8:45 AM MINA Banks NP [facility] [address] Maintenance   Topic Date Due    Pneumococcal 0-64 years Vaccine (1 of 2 - PPSV23) 06/02/2022 (Originally 9/11/1989)    COVID-19 Vaccine (1) 06/02/2022 (Originally 9/11/1988)    Flu vaccine (1) 10/28/2022 (Originally 9/1/2021)    A1C test (Diabetic or Prediabetic)  07/21/2022    Depression Screen  07/21/2022    DTaP/Tdap/Td vaccine (2 - Td or Tdap) 02/19/2024    Hepatitis A vaccine  Aged Out    Hepatitis B vaccine  Aged Out    Hib vaccine  Aged Out    Meningococcal (ACWY) vaccine  Aged Out    Varicella vaccine  Discontinued    Hepatitis C screen  Discontinued    HIV screen  Discontinued       Hemoglobin A1C (%)   Date Value   07/21/2021 6.0             ( goal A1C is < 7)   No results found for: LABMICR  LDL Cholesterol (mg/dL)   Date Value   07/21/2021 122   03/03/2017 100       (goal LDL is <100)   AST (U/L)   Date Value   07/21/2021 29     ALT (U/L)   Date Value   07/21/2021 34     BUN (mg/dL)   Date Value   07/21/2021 16     BP Readings from Last 3 Encounters:   07/21/21 126/84   01/28/21 (!) 144/100   09/15/20 120/80          (goal 120/80)    All Future Testing planned in [facility]  Lab Frequency Next Occurrence   COVID-19 Once 02/22/2021               Patient Active Problem List:     Tobacco user     Anxiety state     Sciatica     Lipoma of back     Mild persistent asthma without complication     Synovial cyst of left popliteal space     Chronic back pain     Closed fracture of tuft of distal phalanx of finger     Injury due to fall     Radiating pain     Morbidly obese ([address]

## 2022-02-15 DIAGNOSIS — G89.29 CHRONIC BILATERAL LOW BACK PAIN WITH LEFT-SIDED SCIATICA: ICD-10-CM

## 2022-02-15 DIAGNOSIS — R52 RADIATING PAIN: ICD-10-CM

## 2022-02-15 DIAGNOSIS — M54.42 CHRONIC BILATERAL LOW BACK PAIN WITH LEFT-SIDED SCIATICA: ICD-10-CM

## 2022-02-15 RX ORDER — PANTOPRAZOLE SODIUM 20 MG/1
TABLET, DELAYED RELEASE ORAL
Qty: 60 TABLET | Refills: 5 | Status: SHIPPED | OUTPATIENT
Start: 2022-02-15 | End: 2022-08-12

## 2022-02-15 RX ORDER — TRAMADOL HYDROCHLORIDE 50 MG/1
50 TABLET ORAL EVERY 8 HOURS PRN
Qty: 30 TABLET | Refills: 0 | Status: SHIPPED | OUTPATIENT
Start: 2022-02-15 | End: 2022-02-25 | Stop reason: SDUPTHER

## 2022-02-15 RX ORDER — TRAMADOL HYDROCHLORIDE 50 MG/1
50 TABLET ORAL EVERY 8 HOURS PRN
Qty: 90 TABLET | Refills: 0 | OUTPATIENT
Start: 2022-02-15 | End: 2022-03-17

## 2022-02-25 ENCOUNTER — OFFICE VISIT (OUTPATIENT)
Dept: FAMILY MEDICINE CLINIC | Age: 37
End: 2022-02-25
Payer: MEDICARE

## 2022-02-25 VITALS
RESPIRATION RATE: 16 BRPM | HEART RATE: 85 BPM | HEIGHT: 67 IN | WEIGHT: 255 LBS | OXYGEN SATURATION: 99 % | DIASTOLIC BLOOD PRESSURE: 80 MMHG | SYSTOLIC BLOOD PRESSURE: 125 MMHG | BODY MASS INDEX: 40.02 KG/M2 | TEMPERATURE: 98.3 F

## 2022-02-25 DIAGNOSIS — M54.42 CHRONIC BILATERAL LOW BACK PAIN WITH LEFT-SIDED SCIATICA: Primary | ICD-10-CM

## 2022-02-25 DIAGNOSIS — G89.29 CHRONIC BILATERAL LOW BACK PAIN WITH LEFT-SIDED SCIATICA: Primary | ICD-10-CM

## 2022-02-25 DIAGNOSIS — R52 RADIATING PAIN: ICD-10-CM

## 2022-02-25 PROCEDURE — G8417 CALC BMI ABV UP PARAM F/U: HCPCS | Performed by: NURSE PRACTITIONER

## 2022-02-25 PROCEDURE — G8484 FLU IMMUNIZE NO ADMIN: HCPCS | Performed by: NURSE PRACTITIONER

## 2022-02-25 PROCEDURE — 99213 OFFICE O/P EST LOW 20 MIN: CPT | Performed by: NURSE PRACTITIONER

## 2022-02-25 PROCEDURE — 4004F PT TOBACCO SCREEN RCVD TLK: CPT | Performed by: NURSE PRACTITIONER

## 2022-02-25 PROCEDURE — G8427 DOCREV CUR MEDS BY ELIG CLIN: HCPCS | Performed by: NURSE PRACTITIONER

## 2022-02-25 RX ORDER — TRAMADOL HYDROCHLORIDE 50 MG/1
50 TABLET ORAL EVERY 8 HOURS PRN
Qty: 90 TABLET | Refills: 0 | Status: SHIPPED | OUTPATIENT
Start: 2022-02-25 | End: 2022-03-21 | Stop reason: SDUPTHER

## 2022-02-25 ASSESSMENT — PATIENT HEALTH QUESTIONNAIRE - PHQ9
SUM OF ALL RESPONSES TO PHQ QUESTIONS 1-9: 0
SUM OF ALL RESPONSES TO PHQ9 QUESTIONS 1 & 2: 0
1. LITTLE INTEREST OR PLEASURE IN DOING THINGS: 0
SUM OF ALL RESPONSES TO PHQ QUESTIONS 1-9: 0
2. FEELING DOWN, DEPRESSED OR HOPELESS: 0

## 2022-02-25 NOTE — PROGRESS NOTES
Helene Chamorro (:  1985) is a 43 y.o. male,Established patient, here for evaluation of the following chief complaint(s):  Pain         ASSESSMENT/PLAN:  1. Chronic bilateral low back pain with left-sided sciatica  -     traMADol (ULTRAM) 50 MG tablet; Take 1 tablet by mouth every 8 hours as needed for Pain for up to 30 days. Intended supply: 7 days. Take lowest dose possible to manage pain, Disp-90 tablet, R-0Normal  2. Radiating pain  -     traMADol (ULTRAM) 50 MG tablet; Take 1 tablet by mouth every 8 hours as needed for Pain for up to 30 days. Intended supply: 7 days. Take lowest dose possible to manage pain, Disp-90 tablet, R-0Normal      Return in about 3 months (around 2022), or if symptoms worsen or fail to improve, for medication follow up. Subjective   SUBJECTIVE/OBJECTIVE:  Presents to office today for med check related to chronic pain. Reports tramadol controls his pain enough to be able to continue to function. Its not controlled but enough for him. He does not trust medical providers to take care of his back issues. I have tried and tried to get him to go get another opinion from pain management, neurosurgery, etc. he refuses. Reports a good appetite, drinking fluids. Normal bowel and bladder pattern. Sleeping ok. Review of Systems   Constitutional: Positive for activity change. Negative for appetite change, fatigue and fever. Frequent alcohol intake  Unable to exercise daily due to pain   HENT: Negative for congestion, rhinorrhea and sinus pain. Eyes: Negative for photophobia and visual disturbance. Respiratory: Negative for cough and shortness of breath. Current smoker   Cardiovascular: Negative for chest pain. Gastrointestinal: Negative for abdominal distention, abdominal pain, constipation, diarrhea and nausea. Endocrine: Negative for polydipsia, polyphagia and polyuria.    Genitourinary: Negative for difficulty urinating, dysuria, frequency and urgency. Musculoskeletal: Positive for arthralgias, back pain, gait problem and myalgias. Chronic back pain-follows with neurosurgery and pain management   Skin: Positive for wound (open left groin wound). Negative for rash. Allergic/Immunologic: Negative for environmental allergies. Neurological: Negative for dizziness, light-headedness and headaches. Psychiatric/Behavioral: Negative for dysphoric mood and sleep disturbance. The patient is not nervous/anxious. Objective   Physical Exam  Vitals and nursing note reviewed. Constitutional:       General: He is not in acute distress. Appearance: He is not ill-appearing. HENT:      Head: Normocephalic. Nose: Nose normal.      Mouth/Throat:      Lips: Pink. Pulmonary:      Effort: Pulmonary effort is normal. No respiratory distress. Neurological:      Mental Status: He is alert and oriented to person, place, and time. Psychiatric:         Attention and Perception: Attention normal.         Speech: Speech normal.         Behavior: Behavior is cooperative. An electronic signature was used to authenticate this note.     --MINA Young NP

## 2022-03-21 DIAGNOSIS — L02.214 ABSCESS OF LEFT GROIN: ICD-10-CM

## 2022-03-21 DIAGNOSIS — R52 RADIATING PAIN: ICD-10-CM

## 2022-03-21 DIAGNOSIS — M54.42 CHRONIC BILATERAL LOW BACK PAIN WITH LEFT-SIDED SCIATICA: ICD-10-CM

## 2022-03-21 DIAGNOSIS — G89.29 CHRONIC BILATERAL LOW BACK PAIN WITH LEFT-SIDED SCIATICA: ICD-10-CM

## 2022-03-21 ASSESSMENT — ENCOUNTER SYMPTOMS
NAUSEA: 0
SINUS PAIN: 0
ABDOMINAL PAIN: 0
SHORTNESS OF BREATH: 0
COUGH: 0
DIARRHEA: 0
PHOTOPHOBIA: 0
RHINORRHEA: 0
BACK PAIN: 1
CONSTIPATION: 0
ABDOMINAL DISTENTION: 0

## 2022-03-21 NOTE — TELEPHONE ENCOUNTER
Last visit: 02/25/22  Last Med refill: tramadol 02/25/22, mupirocin 10/20/21  Does patient have enough medication for 72 hours: yes    Next Visit Date:  No future appointments.     Health Maintenance   Topic Date Due    Pneumococcal 0-64 years Vaccine (1 of 2 - PPSV23) 06/02/2022 (Originally 9/11/1989)    COVID-19 Vaccine (1) 06/02/2022 (Originally 9/11/1988)    Flu vaccine (1) 10/28/2022 (Originally 9/1/2021)    A1C test (Diabetic or Prediabetic)  07/21/2022    Depression Screen  02/25/2023    DTaP/Tdap/Td vaccine (2 - Td or Tdap) 02/19/2024    Hepatitis A vaccine  Aged Out    Hepatitis B vaccine  Aged Out    Hib vaccine  Aged Out    Meningococcal (ACWY) vaccine  Aged Out    Varicella vaccine  Discontinued    Hepatitis C screen  Discontinued    HIV screen  Discontinued       Hemoglobin A1C (%)   Date Value   07/21/2021 6.0             ( goal A1C is < 7)   No results found for: LABMICR  LDL Cholesterol (mg/dL)   Date Value   07/21/2021 122   03/03/2017 100       (goal LDL is <100)   AST (U/L)   Date Value   07/21/2021 29     ALT (U/L)   Date Value   07/21/2021 34     BUN (mg/dL)   Date Value   07/21/2021 16     BP Readings from Last 3 Encounters:   02/25/22 125/80   07/21/21 126/84   01/28/21 (!) 144/100          (goal 120/80)    All Future Testing planned in [facility]  Lab Frequency Next Occurrence               Patient Active Problem List:     Tobacco user     Anxiety state     Sciatica     Lipoma of back     Mild persistent asthma without complication     Synovial cyst of left popliteal space     Chronic back pain     Closed fracture of tuft of distal phalanx of finger     Injury due to fall     Radiating pain     Morbidly obese (HCC)

## 2022-03-22 RX ORDER — TRAMADOL HYDROCHLORIDE 50 MG/1
50 TABLET ORAL EVERY 8 HOURS PRN
Qty: 90 TABLET | Refills: 0 | Status: SHIPPED | OUTPATIENT
Start: 2022-03-22 | End: 2022-04-21 | Stop reason: SDUPTHER

## 2022-04-21 DIAGNOSIS — R52 RADIATING PAIN: ICD-10-CM

## 2022-04-21 DIAGNOSIS — M54.42 CHRONIC BILATERAL LOW BACK PAIN WITH LEFT-SIDED SCIATICA: ICD-10-CM

## 2022-04-21 DIAGNOSIS — G89.29 CHRONIC BILATERAL LOW BACK PAIN WITH LEFT-SIDED SCIATICA: ICD-10-CM

## 2022-04-22 RX ORDER — TRAMADOL HYDROCHLORIDE 50 MG/1
50 TABLET ORAL EVERY 8 HOURS PRN
Qty: 90 TABLET | Refills: 0 | Status: SHIPPED | OUTPATIENT
Start: 2022-04-22 | End: 2022-05-18 | Stop reason: SDUPTHER

## 2022-05-18 DIAGNOSIS — R52 RADIATING PAIN: ICD-10-CM

## 2022-05-18 DIAGNOSIS — M54.42 CHRONIC BILATERAL LOW BACK PAIN WITH LEFT-SIDED SCIATICA: ICD-10-CM

## 2022-05-18 DIAGNOSIS — G89.29 CHRONIC BILATERAL LOW BACK PAIN WITH LEFT-SIDED SCIATICA: ICD-10-CM

## 2022-05-18 RX ORDER — TRAMADOL HYDROCHLORIDE 50 MG/1
100 TABLET ORAL EVERY 8 HOURS PRN
Qty: 180 TABLET | Refills: 0 | Status: SHIPPED | OUTPATIENT
Start: 2022-05-18 | End: 2022-05-20 | Stop reason: SDUPTHER

## 2022-05-20 DIAGNOSIS — L02.214 ABSCESS OF LEFT GROIN: ICD-10-CM

## 2022-05-20 DIAGNOSIS — G89.29 CHRONIC BILATERAL LOW BACK PAIN WITH LEFT-SIDED SCIATICA: ICD-10-CM

## 2022-05-20 DIAGNOSIS — R52 RADIATING PAIN: ICD-10-CM

## 2022-05-20 DIAGNOSIS — M54.42 CHRONIC BILATERAL LOW BACK PAIN WITH LEFT-SIDED SCIATICA: ICD-10-CM

## 2022-05-20 NOTE — TELEPHONE ENCOUNTER
Last visit: 02/25/2022  Last Med refill: 04/25/2022  Does patient have enough medication for 72 hours: yes. Patient needs 05/18 script sent to [facility] on [address] [facility] [facility] and cancelled at R/A. Please note AL increased to #180 monthly    Next Visit Date:  No future appointments.     Health Maintenance   Topic Date Due    Pneumococcal 0-64 years Vaccine (1 - PCV) 06/02/2022 (Originally 9/11/1989)    COVID-19 Vaccine (1) 06/02/2022 (Originally 9/11/1988)    Flu vaccine (Season Ended) 10/28/2022 (Originally 9/1/2022)    A1C test (Diabetic or Prediabetic)  07/21/2022    Depression Screen  02/25/2023    DTaP/Tdap/Td vaccine (2 - Td or Tdap) 02/19/2024    Hepatitis A vaccine  Aged Out    Hepatitis B vaccine  Aged Out    Hib vaccine  Aged Out    Meningococcal (ACWY) vaccine  Aged Out    Varicella vaccine  Discontinued    Hepatitis C screen  Discontinued    HIV screen  Discontinued       Hemoglobin A1C (%)   Date Value   07/21/2021 6.0             ( goal A1C is < 7)   No results found for: LABMICR  LDL Cholesterol (mg/dL)   Date Value   07/21/2021 122   03/03/2017 100       (goal LDL is <100)   AST (U/L)   Date Value   07/21/2021 29     ALT (U/L)   Date Value   07/21/2021 34     BUN (mg/dL)   Date Value   07/21/2021 16     BP Readings from Last 3 Encounters:   02/25/22 125/80   07/21/21 126/84   01/28/21 (!) 144/100          (goal 120/80)    All Future Testing planned in [facility]  Lab Frequency Next Occurrence               Patient Active Problem List:     Tobacco user     Anxiety state     Sciatica     Lipoma of back     Mild persistent asthma without complication     Synovial cyst of left popliteal space     Chronic back pain     Closed fracture of tuft of distal phalanx of finger     Injury due to fall     Radiating pain     Morbidly obese (HCC)

## 2022-05-20 NOTE — TELEPHONE ENCOUNTER
Patient is transferring pharmacy to [facility] on [facility]. Tramadol script sent on 5/18 was 6 days to early so the medication was not filled and unable to pick.  Writer canceled script, new script pending for new pharmacy

## 2022-05-22 RX ORDER — TRAMADOL HYDROCHLORIDE 50 MG/1
100 TABLET ORAL EVERY 8 HOURS PRN
Qty: 180 TABLET | Refills: 0 | Status: SHIPPED | OUTPATIENT
Start: 2022-05-22 | End: 2022-06-15 | Stop reason: SDUPTHER

## 2022-06-15 ENCOUNTER — TELEPHONE (OUTPATIENT)
Dept: FAMILY MEDICINE CLINIC | Age: 37
End: 2022-06-15

## 2022-06-15 DIAGNOSIS — G89.29 CHRONIC BILATERAL LOW BACK PAIN WITH LEFT-SIDED SCIATICA: ICD-10-CM

## 2022-06-15 DIAGNOSIS — R52 RADIATING PAIN: ICD-10-CM

## 2022-06-15 DIAGNOSIS — M54.42 CHRONIC BILATERAL LOW BACK PAIN WITH LEFT-SIDED SCIATICA: ICD-10-CM

## 2022-06-15 RX ORDER — TRAMADOL HYDROCHLORIDE 50 MG/1
100 TABLET ORAL EVERY 8 HOURS PRN
Qty: 180 TABLET | Refills: 0 | Status: SHIPPED | OUTPATIENT
Start: 2022-06-15 | End: 2022-07-14 | Stop reason: SDUPTHER

## 2022-06-15 NOTE — TELEPHONE ENCOUNTER
LOV: 02/25/2022  LRF: 05/22/2022  RTO: NONE  Health Maintenance   Topic Date Due    COVID-19 Vaccine (1) Never done    Pneumococcal 0-64 years Vaccine (1 - PCV) Never done    Flu vaccine (Season Ended) 10/28/2022 (Originally 9/1/2022)    A1C test (Diabetic or Prediabetic)  07/21/2022    Depression Screen  02/25/2023    DTaP/Tdap/Td vaccine (2 - Td or Tdap) 02/19/2024    Hepatitis A vaccine  Aged Out    Hepatitis B vaccine  Aged Out    Hib vaccine  Aged Out    Meningococcal (ACWY) vaccine  Aged Out    Varicella vaccine  Discontinued    Hepatitis C screen  Discontinued    HIV screen  Discontinued             (applicable per patient's age: Cancer Screenings, Depression Screening, Fall Risk Screening, Immunizations)    Hemoglobin A1C (%)   Date Value   07/21/2021 6.0     LDL Cholesterol (mg/dL)   Date Value   07/21/2021 122     AST (U/L)   Date Value   07/21/2021 29     ALT (U/L)   Date Value   07/21/2021 34     BUN (mg/dL)   Date Value   07/21/2021 16      (goal A1C is < 7)   (goal LDL is <100) need 30-50% reduction from baseline     BP Readings from Last 3 Encounters:   02/25/22 125/80   07/21/21 126/84   01/28/21 (!) 144/100    (goal /80)      All Future Testing planned in [facility]:  Lab Frequency Next Occurrence       Next Visit Date:  No future appointments.          Patient Active Problem List:     Tobacco user     Anxiety state     Sciatica     Lipoma of back     Mild persistent asthma without complication     Synovial cyst of left popliteal space     Chronic back pain     Closed fracture of tuft of distal phalanx of finger     Injury due to fall     Radiating pain     Morbidly obese (HCC)

## 2022-07-07 ENCOUNTER — PATIENT MESSAGE (OUTPATIENT)
Dept: FAMILY MEDICINE CLINIC | Age: 37
End: 2022-07-07

## 2022-07-07 DIAGNOSIS — R73.03 PRE-DIABETES: ICD-10-CM

## 2022-07-07 DIAGNOSIS — Z13.220 LIPID SCREENING: Primary | ICD-10-CM

## 2022-07-07 DIAGNOSIS — F10.90 HEAVY ALCOHOL USE: ICD-10-CM

## 2022-07-07 NOTE — TELEPHONE ENCOUNTER
From: Marely Brown  To: Maple Moras  Sent: 7/7/2022 11:50 AM EDT  Subject: A1c testing    Rian Peña was supposed to get his a1c checked a few months back but never did. I told him he needs to do this asap. Does the paperwork need sent back to the lab for him to come out and have it checked?

## 2022-07-14 DIAGNOSIS — G89.29 CHRONIC BILATERAL LOW BACK PAIN WITH LEFT-SIDED SCIATICA: ICD-10-CM

## 2022-07-14 DIAGNOSIS — R52 RADIATING PAIN: ICD-10-CM

## 2022-07-14 DIAGNOSIS — M54.42 CHRONIC BILATERAL LOW BACK PAIN WITH LEFT-SIDED SCIATICA: ICD-10-CM

## 2022-07-15 RX ORDER — TRAMADOL HYDROCHLORIDE 50 MG/1
100 TABLET ORAL EVERY 8 HOURS PRN
Qty: 180 TABLET | Refills: 0 | Status: SHIPPED | OUTPATIENT
Start: 2022-07-15 | End: 2022-08-11 | Stop reason: SDUPTHER

## 2022-07-15 NOTE — TELEPHONE ENCOUNTER
Last script sent:06/15/2022  Last OV:02/25/2022  Next Visit Date:  No future appointments.     Health Maintenance   Topic Date Due    COVID-19 Vaccine (1) Never done    Pneumococcal 0-64 years Vaccine (1 - PCV) Never done    A1C test (Diabetic or Prediabetic)  07/21/2022    Flu vaccine (1) 09/01/2022    Depression Screen  02/25/2023    DTaP/Tdap/Td vaccine (2 - Td or Tdap) 02/19/2024    Hepatitis A vaccine  Aged Out    Hepatitis B vaccine  Aged Out    Hib vaccine  Aged Out    Meningococcal (ACWY) vaccine  Aged Out    Varicella vaccine  Discontinued    Hepatitis C screen  Discontinued    HIV screen  Discontinued       Hemoglobin A1C (%)   Date Value   07/21/2021 6.0             ( goal A1C is < 7)   No results found for: LABMICR  LDL Cholesterol (mg/dL)   Date Value   07/21/2021 122       (goal LDL is <100)   AST (U/L)   Date Value   07/21/2021 29     ALT (U/L)   Date Value   07/21/2021 34     BUN (mg/dL)   Date Value   07/21/2021 16     BP Readings from Last 3 Encounters:   02/25/22 125/80   07/21/21 126/84   01/28/21 (!) 144/100          (goal 120/80)    All Future Testing planned in [facility]  Lab Frequency Next Occurrence   Hemoglobin A1C Once 07/14/2022   Lipid, Fasting Once 07/14/2022   Comprehensive Metabolic Panel Once 75/85/7320   CBC Once 07/14/2022         Patient Active Problem List:     Tobacco user     Anxiety state     Sciatica     Lipoma of back     Mild persistent asthma without complication     Synovial cyst of left popliteal space     Chronic back pain     Closed fracture of tuft of distal phalanx of finger     Injury due to fall     Radiating pain     Morbidly obese (HCC) show

## 2022-07-18 ENCOUNTER — HOSPITAL ENCOUNTER (OUTPATIENT)
Age: 37
Setting detail: SPECIMEN
Discharge: HOME | End: 2022-07-18

## 2022-07-18 DIAGNOSIS — F10.90 HEAVY ALCOHOL USE: ICD-10-CM

## 2022-07-18 DIAGNOSIS — R73.03 PRE-DIABETES: ICD-10-CM

## 2022-07-18 DIAGNOSIS — Z13.220 LIPID SCREENING: ICD-10-CM

## 2022-07-18 LAB
ALBUMIN SERPL-MCNC: 4.3 G/DL (ref 3.5–5.2)
ALBUMIN/GLOBULIN RATIO: 1.4 (ref 1–2.5)
ALP BLD-CCNC: 89 U/L (ref 40–129)
ALT SERPL-CCNC: 30 U/L (ref 5–41)
ANION GAP SERPL CALCULATED.3IONS-SCNC: 13 MMOL/L (ref 9–17)
AST SERPL-CCNC: 27 U/L
BILIRUB SERPL-MCNC: 0.39 MG/DL (ref 0.3–1.2)
BUN BLDV-MCNC: 13 MG/DL (ref 6–20)
CALCIUM SERPL-MCNC: 9.3 MG/DL (ref 8.6–10.4)
CHLORIDE BLD-SCNC: 100 MMOL/L (ref 98–107)
CHOLESTEROL, FASTING: 208 MG/DL
CHOLESTEROL/HDL RATIO: 4.6
CO2: 24 MMOL/L (ref 20–31)
CREAT SERPL-MCNC: 0.92 MG/DL (ref 0.7–1.2)
ESTIMATED AVERAGE GLUCOSE: 120 MG/DL
GFR AFRICAN AMERICAN: >60 ML/MIN
GFR NON-AFRICAN AMERICAN: >60 ML/MIN
GFR SERPL CREATININE-BSD FRML MDRD: ABNORMAL ML/MIN/{1.73_M2}
GLUCOSE BLD-MCNC: 113 MG/DL (ref 70–99)
HBA1C MFR BLD: 5.8 % (ref 4–6)
HCT VFR BLD CALC: 49.2 % (ref 40.7–50.3)
HDLC SERPL-MCNC: 45 MG/DL
HEMOGLOBIN: 16.7 G/DL (ref 13–17)
LDL CHOLESTEROL: 134 MG/DL (ref 0–130)
MCH RBC QN AUTO: 31.7 PG (ref 25.2–33.5)
MCHC RBC AUTO-ENTMCNC: 33.9 G/DL (ref 28.4–34.8)
MCV RBC AUTO: 93.4 FL (ref 82.6–102.9)
NRBC AUTOMATED: 0 PER 100 WBC
PDW BLD-RTO: 12 % (ref 11.8–14.4)
PLATELET # BLD: 207 K/UL (ref 138–453)
PMV BLD AUTO: 11.8 FL (ref 8.1–13.5)
POTASSIUM SERPL-SCNC: 4.4 MMOL/L (ref 3.7–5.3)
RBC # BLD: 5.27 M/UL (ref 4.21–5.77)
SODIUM BLD-SCNC: 137 MMOL/L (ref 135–144)
TOTAL PROTEIN: 7.3 G/DL (ref 6.4–8.3)
TRIGLYCERIDE, FASTING: 143 MG/DL
WBC # BLD: 8.3 K/UL (ref 3.5–11.3)

## 2022-08-10 ENCOUNTER — PATIENT MESSAGE (OUTPATIENT)
Dept: FAMILY MEDICINE CLINIC | Age: 37
End: 2022-08-10

## 2022-08-10 DIAGNOSIS — G89.29 CHRONIC BILATERAL LOW BACK PAIN WITH LEFT-SIDED SCIATICA: ICD-10-CM

## 2022-08-10 DIAGNOSIS — R52 RADIATING PAIN: ICD-10-CM

## 2022-08-10 DIAGNOSIS — M54.42 CHRONIC BILATERAL LOW BACK PAIN WITH LEFT-SIDED SCIATICA: ICD-10-CM

## 2022-08-10 NOTE — TELEPHONE ENCOUNTER
From: Blair Pallas  To: Briana Gracia  Sent: 8/10/2022 12:58 PM EDT  Subject: Refill    Requesting a refill on the tramadol please.  It's not in mediation list

## 2022-08-11 RX ORDER — TRAMADOL HYDROCHLORIDE 50 MG/1
100 TABLET ORAL EVERY 8 HOURS PRN
Qty: 180 TABLET | Refills: 0 | Status: SHIPPED | OUTPATIENT
Start: 2022-08-11 | End: 2022-08-16 | Stop reason: SDUPTHER

## 2022-08-12 DIAGNOSIS — K21.9 GASTROESOPHAGEAL REFLUX DISEASE, UNSPECIFIED WHETHER ESOPHAGITIS PRESENT: ICD-10-CM

## 2022-08-12 DIAGNOSIS — R11.15 EMESIS, PERSISTENT: ICD-10-CM

## 2022-08-12 RX ORDER — PANTOPRAZOLE SODIUM 20 MG/1
TABLET, DELAYED RELEASE ORAL
Qty: 180 TABLET | Refills: 1 | Status: SHIPPED | OUTPATIENT
Start: 2022-08-12 | End: 2022-10-05 | Stop reason: SDUPTHER

## 2022-08-15 DIAGNOSIS — R52 RADIATING PAIN: ICD-10-CM

## 2022-08-15 DIAGNOSIS — G89.29 CHRONIC BILATERAL LOW BACK PAIN WITH LEFT-SIDED SCIATICA: ICD-10-CM

## 2022-08-15 DIAGNOSIS — M54.42 CHRONIC BILATERAL LOW BACK PAIN WITH LEFT-SIDED SCIATICA: ICD-10-CM

## 2022-08-15 RX ORDER — TRAMADOL HYDROCHLORIDE 50 MG/1
TABLET ORAL
Qty: 180 TABLET | Refills: 0 | OUTPATIENT
Start: 2022-08-15

## 2022-08-15 NOTE — TELEPHONE ENCOUNTER
A user error has taken place: medication ordered in error, not dispensed to this patient. Patient's spouse called back stating refill is no longer needed. They picked up the medication that was called to a different Rx in error.

## 2022-08-16 DIAGNOSIS — R52 RADIATING PAIN: ICD-10-CM

## 2022-08-16 DIAGNOSIS — G89.29 CHRONIC BILATERAL LOW BACK PAIN WITH LEFT-SIDED SCIATICA: ICD-10-CM

## 2022-08-16 DIAGNOSIS — M54.42 CHRONIC BILATERAL LOW BACK PAIN WITH LEFT-SIDED SCIATICA: ICD-10-CM

## 2022-08-16 RX ORDER — TRAMADOL HYDROCHLORIDE 50 MG/1
100 TABLET ORAL EVERY 8 HOURS PRN
Qty: 180 TABLET | Refills: 0 | Status: SHIPPED | OUTPATIENT
Start: 2022-08-16 | End: 2022-08-23

## 2022-08-16 NOTE — TELEPHONE ENCOUNTER
----- Message from Smith Celeste sent at 8/15/2022  9:27 AM EDT -----  Subject: Refill Request    QUESTIONS  Name of Medication? traMADol (ULTRAM) 50 MG tablet  Patient-reported dosage and instructions? Instructions? Take 2 tablets by   mouth every 8 hours as needed for Pain for up to 7 days. Intended supply? 7 days. Take lowest dose possible to manage pain  How many days do you have left? 2  Preferred Pharmacy? [facility]/PHARMACY #[id number]  Pharmacy phone number (if available)? [phone]  Additional Information for Provider? The patient spouse says [facility] needs the   script sent back over to them. They do not have it.  ---------------------------------------------------------------------------  --------------  CALL BACK INFO  What is the best way for the office to contact you? OK to leave message on   voicemail  Preferred Call Back Phone Number? [phone]  ---------------------------------------------------------------------------  --------------  SCRIPT ANSWERS  Relationship to Patient? Other  Representative Name? Shazia  Is the Representative on the appropriate HIPAA document in Epic?  Yes

## 2022-09-07 ENCOUNTER — PATIENT MESSAGE (OUTPATIENT)
Dept: FAMILY MEDICINE CLINIC | Age: 37
End: 2022-09-07

## 2022-09-07 DIAGNOSIS — G89.29 CHRONIC BILATERAL LOW BACK PAIN WITH LEFT-SIDED SCIATICA: ICD-10-CM

## 2022-09-07 DIAGNOSIS — M54.42 CHRONIC BILATERAL LOW BACK PAIN WITH LEFT-SIDED SCIATICA: ICD-10-CM

## 2022-09-07 DIAGNOSIS — R52 RADIATING PAIN: ICD-10-CM

## 2022-09-07 RX ORDER — TRAMADOL HYDROCHLORIDE 50 MG/1
100 TABLET ORAL EVERY 8 HOURS PRN
Qty: 42 TABLET | Refills: 0 | OUTPATIENT
Start: 2022-09-07 | End: 2022-09-14

## 2022-09-07 NOTE — TELEPHONE ENCOUNTER
From: Cordelia rAriola  To: Ariella Fisher  Sent: 9/7/2022 12:41 PM EDT  Subject: Refill    I have an appointment for 10/6. But I need a med refill on tramadol.

## 2022-09-09 DIAGNOSIS — L02.214 ABSCESS OF LEFT GROIN: ICD-10-CM

## 2022-09-09 RX ORDER — TRAMADOL HYDROCHLORIDE 50 MG/1
100 TABLET ORAL EVERY 8 HOURS PRN
Qty: 42 TABLET | Refills: 0 | Status: SHIPPED | OUTPATIENT
Start: 2022-09-09 | End: 2022-09-15 | Stop reason: SDUPTHER

## 2022-09-09 NOTE — TELEPHONE ENCOUNTER
LOV 2-25-22  LRF 5-22-22    Health Maintenance   Topic Date Due    COVID-19 Vaccine (1) Never done    Pneumococcal 0-64 years Vaccine (1 - PCV) Never done    Flu vaccine (1) Never done    Depression Screen  02/25/2023    A1C test (Diabetic or Prediabetic)  07/18/2023    DTaP/Tdap/Td vaccine (2 - Td or Tdap) 02/19/2024    Hepatitis A vaccine  Aged Out    Hepatitis B vaccine  Aged Out    Hib vaccine  Aged Out    Meningococcal (ACWY) vaccine  Aged Out    Varicella vaccine  Discontinued    Hepatitis C screen  Discontinued    HIV screen  Discontinued             (applicable per patient's age: Cancer Screenings, Depression Screening, Fall Risk Screening, Immunizations)    Hemoglobin A1C (%)   Date Value   07/18/2022 5.8   07/21/2021 6.0     LDL Cholesterol (mg/dL)   Date Value   07/18/2022 134 (H)     AST (U/L)   Date Value   07/18/2022 27     ALT (U/L)   Date Value   07/18/2022 30     BUN (mg/dL)   Date Value   07/18/2022 13      (goal A1C is < 7)   (goal LDL is <100) need 30-50% reduction from baseline     BP Readings from Last 3 Encounters:   02/25/22 125/80   07/21/21 126/84   01/28/21 (!) 144/100    (goal /80)      All Future Testing planned in [facility]:  Lab Frequency Next Occurrence       Next Visit Date:  Future Appointments   Date Time Provider Yari Bazan   10/6/2022  3:15 PM [facility]            Patient Active Problem List:     Tobacco user     Anxiety state     Sciatica     Lipoma of back     Mild persistent asthma without complication     Synovial cyst of left popliteal space     Chronic back pain     Closed fracture of tuft of distal phalanx of finger     Injury due to fall     Radiating pain     Morbidly obese ([address]

## 2022-09-15 DIAGNOSIS — R52 RADIATING PAIN: ICD-10-CM

## 2022-09-15 DIAGNOSIS — G89.29 CHRONIC BILATERAL LOW BACK PAIN WITH LEFT-SIDED SCIATICA: ICD-10-CM

## 2022-09-15 DIAGNOSIS — M54.42 CHRONIC BILATERAL LOW BACK PAIN WITH LEFT-SIDED SCIATICA: ICD-10-CM

## 2022-09-16 RX ORDER — TRAMADOL HYDROCHLORIDE 50 MG/1
100 TABLET ORAL EVERY 8 HOURS PRN
Qty: 42 TABLET | Refills: 0 | Status: SHIPPED | OUTPATIENT
Start: 2022-09-16 | End: 2022-09-22 | Stop reason: SDUPTHER

## 2022-09-20 DIAGNOSIS — R52 RADIATING PAIN: ICD-10-CM

## 2022-09-20 DIAGNOSIS — G89.29 CHRONIC BILATERAL LOW BACK PAIN WITH LEFT-SIDED SCIATICA: ICD-10-CM

## 2022-09-20 DIAGNOSIS — M54.42 CHRONIC BILATERAL LOW BACK PAIN WITH LEFT-SIDED SCIATICA: ICD-10-CM

## 2022-09-20 RX ORDER — TRAMADOL HYDROCHLORIDE 50 MG/1
100 TABLET ORAL EVERY 8 HOURS PRN
Qty: 42 TABLET | Refills: 0 | OUTPATIENT
Start: 2022-09-20 | End: 2022-09-27

## 2022-09-22 DIAGNOSIS — R52 RADIATING PAIN: ICD-10-CM

## 2022-09-22 DIAGNOSIS — G89.29 CHRONIC BILATERAL LOW BACK PAIN WITH LEFT-SIDED SCIATICA: ICD-10-CM

## 2022-09-22 DIAGNOSIS — M54.42 CHRONIC BILATERAL LOW BACK PAIN WITH LEFT-SIDED SCIATICA: ICD-10-CM

## 2022-09-22 RX ORDER — TRAMADOL HYDROCHLORIDE 50 MG/1
100 TABLET ORAL EVERY 8 HOURS PRN
Qty: 84 TABLET | Refills: 0 | Status: SHIPPED | OUTPATIENT
Start: 2022-09-22 | End: 2022-10-06 | Stop reason: SDUPTHER

## 2022-09-22 NOTE — TELEPHONE ENCOUNTER
LOV 2/25/22  LRF 9/15/22  RTO 10/6/22    Health Maintenance   Topic Date Due    COVID-19 Vaccine (1) Never done    Pneumococcal 0-64 years Vaccine (1 - PCV) Never done    Flu vaccine (1) Never done    Depression Screen  02/25/2023    A1C test (Diabetic or Prediabetic)  07/18/2023    DTaP/Tdap/Td vaccine (2 - Td or Tdap) 02/19/2024    Hepatitis A vaccine  Aged Out    Hepatitis B vaccine  Aged Out    Hib vaccine  Aged Out    Meningococcal (ACWY) vaccine  Aged Out    Varicella vaccine  Discontinued    Hepatitis C screen  Discontinued    HIV screen  Discontinued             (applicable per patient's age: Cancer Screenings, Depression Screening, Fall Risk Screening, Immunizations)    Hemoglobin A1C (%)   Date Value   07/18/2022 5.8   07/21/2021 6.0     LDL Cholesterol (mg/dL)   Date Value   07/18/2022 134 (H)     AST (U/L)   Date Value   07/18/2022 27     ALT (U/L)   Date Value   07/18/2022 30     BUN (mg/dL)   Date Value   07/18/2022 13      (goal A1C is < 7)   (goal LDL is <100) need 30-50% reduction from baseline     BP Readings from Last 3 Encounters:   02/25/22 125/80   07/21/21 126/84   01/28/21 (!) 144/100    (goal /80)      All Future Testing planned in [facility]:  Lab Frequency Next Occurrence       Next Visit Date:  Future Appointments   Date Time Provider Yari Bazan   10/6/2022  3:15 PM MINA Schneider - JEREMY Clay            Patient Active Problem List:     Tobacco user     Anxiety state     Sciatica     Lipoma of back     Mild persistent asthma without complication     Synovial cyst of left popliteal space     Chronic back pain     Closed fracture of tuft of distal phalanx of finger     Injury due to fall     Radiating pain     Morbidly obese ([address]

## 2022-10-05 DIAGNOSIS — M54.42 CHRONIC BILATERAL LOW BACK PAIN WITH LEFT-SIDED SCIATICA: ICD-10-CM

## 2022-10-05 DIAGNOSIS — K21.9 GASTROESOPHAGEAL REFLUX DISEASE, UNSPECIFIED WHETHER ESOPHAGITIS PRESENT: ICD-10-CM

## 2022-10-05 DIAGNOSIS — R11.15 EMESIS, PERSISTENT: ICD-10-CM

## 2022-10-05 DIAGNOSIS — G89.29 CHRONIC BILATERAL LOW BACK PAIN WITH LEFT-SIDED SCIATICA: ICD-10-CM

## 2022-10-05 DIAGNOSIS — R52 RADIATING PAIN: ICD-10-CM

## 2022-10-05 RX ORDER — PANTOPRAZOLE SODIUM 20 MG/1
20 TABLET, DELAYED RELEASE ORAL DAILY
Qty: 180 TABLET | Refills: 1 | Status: SHIPPED | OUTPATIENT
Start: 2022-10-05

## 2022-10-05 RX ORDER — TRAMADOL HYDROCHLORIDE 50 MG/1
100 TABLET ORAL EVERY 8 HOURS PRN
Qty: 84 TABLET | Refills: 0 | Status: CANCELLED | OUTPATIENT
Start: 2022-10-05 | End: 2022-10-19

## 2022-10-05 NOTE — TELEPHONE ENCOUNTER
Last visit: 2/25/22  Last Med refill: 8/12/22  Does patient have enough medication for 72 hours: Yes    Next Visit Date:  Future Appointments   Date Time Provider Yari Bazan   10/6/2022  3:15 PM MINA Bill NP [address] Maintenance   Topic Date Due    COVID-19 Vaccine (1) Never done    Pneumococcal 0-64 years Vaccine (1 - PCV) Never done    Flu vaccine (1) Never done    Depression Screen  02/25/2023    A1C test (Diabetic or Prediabetic)  07/18/2023    DTaP/Tdap/Td vaccine (2 - Td or Tdap) 02/19/2024    Hepatitis A vaccine  Aged Out    Hepatitis B vaccine  Aged Out    Hib vaccine  Aged Out    Meningococcal (ACWY) vaccine  Aged Out    Varicella vaccine  Discontinued    Hepatitis C screen  Discontinued    HIV screen  Discontinued       Hemoglobin A1C (%)   Date Value   07/18/2022 5.8   07/21/2021 6.0             ( goal A1C is < 7)   No results found for: LABMICR  LDL Cholesterol (mg/dL)   Date Value   07/18/2022 134 (H)   07/21/2021 122       (goal LDL is <100)   AST (U/L)   Date Value   07/18/2022 27     ALT (U/L)   Date Value   07/18/2022 30     BUN (mg/dL)   Date Value   07/18/2022 13     BP Readings from Last 3 Encounters:   02/25/22 125/80   07/21/21 126/84   01/28/21 (!) 144/100          (goal 120/80)    All Future Testing planned in [facility]  Lab Frequency Next Occurrence               Patient Active Problem List:     Tobacco user     Anxiety state     Sciatica     Lipoma of back     Mild persistent asthma without complication     Synovial cyst of left popliteal space     Chronic back pain     Closed fracture of tuft of distal phalanx of finger     Injury due to fall     Radiating pain     Morbidly obese (HCC)

## 2022-10-06 DIAGNOSIS — G89.29 CHRONIC BILATERAL LOW BACK PAIN WITH LEFT-SIDED SCIATICA: ICD-10-CM

## 2022-10-06 DIAGNOSIS — R52 RADIATING PAIN: ICD-10-CM

## 2022-10-06 DIAGNOSIS — M54.42 CHRONIC BILATERAL LOW BACK PAIN WITH LEFT-SIDED SCIATICA: ICD-10-CM

## 2022-10-07 RX ORDER — TRAMADOL HYDROCHLORIDE 50 MG/1
100 TABLET ORAL EVERY 8 HOURS PRN
Qty: 84 TABLET | Refills: 0 | Status: SHIPPED | OUTPATIENT
Start: 2022-10-07 | End: 2022-10-17 | Stop reason: SDUPTHER

## 2022-10-07 NOTE — TELEPHONE ENCOUNTER
Last visit: 2/25/22  Last Med refill: 9/22/22  Does patient have enough medication for 72 hours: No: pt missed appt yesterday, is rescheduled a month out    Next Visit Date:  Future Appointments   Date Time Provider Yari Bazan   11/30/2022  3:45 PM Karrie Kawasaki, APRN - [facility] [place] [address] Maintenance   Topic Date Due    COVID-19 Vaccine (1) Never done    Pneumococcal 0-64 years Vaccine (1 - PCV) Never done    Flu vaccine (1) Never done    Depression Screen  02/25/2023    A1C test (Diabetic or Prediabetic)  07/18/2023    DTaP/Tdap/Td vaccine (2 - Td or Tdap) 02/19/2024    Hepatitis A vaccine  Aged Out    Hepatitis B vaccine  Aged Out    Hib vaccine  Aged Out    Meningococcal (ACWY) vaccine  Aged Out    Varicella vaccine  Discontinued    Hepatitis C screen  Discontinued    HIV screen  Discontinued       Hemoglobin A1C (%)   Date Value   07/18/2022 5.8   07/21/2021 6.0             ( goal A1C is < 7)   No results found for: LABMICR  LDL Cholesterol (mg/dL)   Date Value   07/18/2022 134 (H)   07/21/2021 122       (goal LDL is <100)   AST (U/L)   Date Value   07/18/2022 27     ALT (U/L)   Date Value   07/18/2022 30     BUN (mg/dL)   Date Value   07/18/2022 13     BP Readings from Last 3 Encounters:   02/25/22 125/80   07/21/21 126/84   01/28/21 (!) 144/100          (goal 120/80)    All Future Testing planned in [facility]  Lab Frequency Next Occurrence               Patient Active Problem List:     Tobacco user     Anxiety state     Sciatica     Lipoma of back     Mild persistent asthma without complication     Synovial cyst of left popliteal space     Chronic back pain     Closed fracture of tuft of distal phalanx of finger     Injury due to fall     Radiating pain     Morbidly obese (HCC)

## 2022-10-17 DIAGNOSIS — M54.42 CHRONIC BILATERAL LOW BACK PAIN WITH LEFT-SIDED SCIATICA: ICD-10-CM

## 2022-10-17 DIAGNOSIS — G89.29 CHRONIC BILATERAL LOW BACK PAIN WITH LEFT-SIDED SCIATICA: ICD-10-CM

## 2022-10-17 DIAGNOSIS — R52 RADIATING PAIN: ICD-10-CM

## 2022-10-18 RX ORDER — TRAMADOL HYDROCHLORIDE 50 MG/1
100 TABLET ORAL EVERY 8 HOURS PRN
Qty: 84 TABLET | Refills: 0 | Status: SHIPPED | OUTPATIENT
Start: 2022-10-18 | End: 2022-10-31 | Stop reason: SDUPTHER

## 2022-10-18 NOTE — TELEPHONE ENCOUNTER
Last visit: 2/25/22  Last Med refill: 10/7/22  Does patient have enough medication for 72 hours: Yes    Next Visit Date:  Future Appointments   Date Time Provider Yari Bazan   11/30/2022  3:45 PM MINA Boogie NP [facility] [address] Maintenance   Topic Date Due    COVID-19 Vaccine (1) Never done    Pneumococcal 0-64 years Vaccine (1 - PCV) Never done    Flu vaccine (1) Never done    Depression Screen  02/25/2023    A1C test (Diabetic or Prediabetic)  07/18/2023    DTaP/Tdap/Td vaccine (2 - Td or Tdap) 02/19/2024    Hepatitis A vaccine  Aged Out    Hib vaccine  Aged Out    Meningococcal (ACWY) vaccine  Aged Out    Varicella vaccine  Discontinued    Hepatitis C screen  Discontinued    HIV screen  Discontinued       Hemoglobin A1C (%)   Date Value   07/18/2022 5.8   07/21/2021 6.0             ( goal A1C is < 7)   No results found for: LABMICR  LDL Cholesterol (mg/dL)   Date Value   07/18/2022 134 (H)   07/21/2021 122       (goal LDL is <100)   AST (U/L)   Date Value   07/18/2022 27     ALT (U/L)   Date Value   07/18/2022 30     BUN (mg/dL)   Date Value   07/18/2022 13     BP Readings from Last 3 Encounters:   02/25/22 125/80   07/21/21 126/84   01/28/21 (!) 144/100          (goal 120/80)    All Future Testing planned in [facility]  Lab Frequency Next Occurrence               Patient Active Problem List:     Tobacco user     Anxiety state     Sciatica     Lipoma of back     Mild persistent asthma without complication     Synovial cyst of left popliteal space     Chronic back pain     Closed fracture of tuft of distal phalanx of finger     Injury due to fall     Radiating pain     Morbidly obese (HCC)

## 2022-10-31 DIAGNOSIS — M54.42 CHRONIC BILATERAL LOW BACK PAIN WITH LEFT-SIDED SCIATICA: ICD-10-CM

## 2022-10-31 DIAGNOSIS — R52 RADIATING PAIN: ICD-10-CM

## 2022-10-31 DIAGNOSIS — G89.29 CHRONIC BILATERAL LOW BACK PAIN WITH LEFT-SIDED SCIATICA: ICD-10-CM

## 2022-10-31 NOTE — TELEPHONE ENCOUNTER
Ghada Phan is a ,  30 y.o. female BLACK/ whose Patient's last menstrual period was 2022. was on 2022 who presents for her annual checkup. She is having no significant problems. With regard to the Gardisil vaccine, she has received all 3 injections. Menstrual status:    Her periods are moderate in flow. She is using three to five pads or tampons per day, usually regular and last 26-30 days. She denies dysmenorrhea. She reports no premenstrual symptoms. Contraception:    The current method of family planning is none. Sexual history:    She  reports being sexually active and has had partner(s) who are male. She reports using the following method of birth control/protection: None. Medical conditions:    Since her last annual GYN exam about two years ago, she has not the following changes in her health history: none. Pap and Mammogram History:    Her most recent Pap smear was 9/15/2020 normal/ HPV-. The patient has never had a mammogram.    The patient does not have a family history of breast cancer. Past Medical History:   Diagnosis Date    Abnormal chest x-ray     Abnormal Pap smear     Chlamydia 2019 & 2020    Encounter for IUD insertion 2017    mirena replaced    HPV vaccine counseling     Pt completed Gardasil series    HSV-2 infection     PPD positive      Past Surgical History:   Procedure Laterality Date    HX COLPOSCOPY      negative    HX HEENT  2015    wisdom tooth extraction       Current Outpatient Medications   Medication Sig Dispense Refill    phenazopyridine (PYRIDIUM) 100 mg tablet       NIFEdipine ER (ADALAT CC) 60 mg ER tablet TAKE 1 TABLET BY MOUTH DAILY FOR HIGH BLOOD PRESSURE 30 Tab 0    traZODone (DESYREL) 100 mg tablet Take 1 Tab by mouth nightly.  Indications: insomnia 90 Tab 1    NIFEdipine ER (PROCARDIA XL) 30 mg ER tablet       levonorgestrel (MIRENA) 20 mcg/24 hr (5 years) IUD 1 Device by Last visit: 02/25/2022  Last Med refill: 10/18/2022  Does patient have enough medication for 72 hours: Yes    Next Visit Date:  Future Appointments   Date Time Provider Yari Bazan   11/30/2022  3:45 PM MINA Avalos NP [facility] [address] Maintenance   Topic Date Due    COVID-19 Vaccine (1) Never done    Pneumococcal 0-64 years Vaccine (1 - PCV) Never done    Flu vaccine (1) Never done    Depression Screen  02/25/2023    A1C test (Diabetic or Prediabetic)  07/18/2023    DTaP/Tdap/Td vaccine (2 - Td or Tdap) 02/19/2024    Hepatitis A vaccine  Aged Out    Hib vaccine  Aged Out    Meningococcal (ACWY) vaccine  Aged Out    Varicella vaccine  Discontinued    Hepatitis C screen  Discontinued    HIV screen  Discontinued       Hemoglobin A1C (%)   Date Value   07/18/2022 5.8   07/21/2021 6.0             ( goal A1C is < 7)   No results found for: LABMICR  LDL Cholesterol (mg/dL)   Date Value   07/18/2022 134 (H)   07/21/2021 122       (goal LDL is <100)   AST (U/L)   Date Value   07/18/2022 27     ALT (U/L)   Date Value   07/18/2022 30     BUN (mg/dL)   Date Value   07/18/2022 13     BP Readings from Last 3 Encounters:   02/25/22 125/80   07/21/21 126/84   01/28/21 (!) 144/100          (goal 120/80)    All Future Testing planned in [facility]  Lab Frequency Next Occurrence               Patient Active Problem List:     Tobacco user     Anxiety state     Sciatica     Lipoma of back     Mild persistent asthma without complication     Synovial cyst of left popliteal space     Chronic back pain     Closed fracture of tuft of distal phalanx of finger     Injury due to fall     Radiating pain     Morbidly obese (HCC) IntraUTERine route once. (Patient not taking: Reported on 2/24/2022)       Allergies: Codeine, Lisinopril-hydrochlorothiazide, and Pcn [penicillins]   Social History     Socioeconomic History    Marital status: SINGLE     Spouse name: Not on file    Number of children: Not on file    Years of education: Not on file    Highest education level: Not on file   Occupational History    Not on file   Tobacco Use    Smoking status: Never Smoker    Smokeless tobacco: Never Used   Substance and Sexual Activity    Alcohol use: No    Drug use: No    Sexual activity: Yes     Partners: Male     Birth control/protection: None   Other Topics Concern    Not on file   Social History Narrative    Not on file     Social Determinants of Health     Financial Resource Strain:     Difficulty of Paying Living Expenses: Not on file   Food Insecurity:     Worried About Running Out of Food in the Last Year: Not on file    Devorah of Food in the Last Year: Not on file   Transportation Needs:     Lack of Transportation (Medical): Not on file    Lack of Transportation (Non-Medical):  Not on file   Physical Activity:     Days of Exercise per Week: Not on file    Minutes of Exercise per Session: Not on file   Stress:     Feeling of Stress : Not on file   Social Connections:     Frequency of Communication with Friends and Family: Not on file    Frequency of Social Gatherings with Friends and Family: Not on file    Attends Jew Services: Not on file    Active Member of Clubs or Organizations: Not on file    Attends Club or Organization Meetings: Not on file    Marital Status: Not on file   Intimate Partner Violence:     Fear of Current or Ex-Partner: Not on file    Emotionally Abused: Not on file    Physically Abused: Not on file    Sexually Abused: Not on file   Housing Stability:     Unable to Pay for Housing in the Last Year: Not on file    Number of Places Lived in the Last Year: Not on file    Unstable Housing in the Last Year: Not on file     Tobacco History:  reports that she has never smoked. She has never used smokeless tobacco.  Alcohol Abuse:  reports no history of alcohol use. Drug Abuse:  reports no history of drug use. Patient Active Problem List   Diagnosis Code    Essential hypertension I10    Severe obesity (BMI 35.0-39. 9) E66.01       Review of Systems - History obtained from the patient  Constitutional: negative for weight loss, fever, night sweats  HEENT: negative for hearing loss, earache, congestion, snoring, sorethroat  CV: negative for chest pain, palpitations, edema  Resp: negative for cough, shortness of breath, wheezing  GI: negative for change in bowel habits, abdominal pain, black or bloody stools  : negative for frequency, dysuria, hematuria, vaginal discharge  MSK: negative for back pain, joint pain, muscle pain  Breast: negative for breast lumps, nipple discharge, galactorrhea  Skin :negative for itching, rash, hives  Neuro: negative for dizziness, headache, confusion, weakness  Psych: negative for anxiety, depression, change in mood  Heme/lymph: negative for bleeding, bruising, pallor    Physical Exam    Visit Vitals  BP (!) 154/97   Wt 218 lb 6.4 oz (99.1 kg)   LMP 02/13/2022   BMI 37.49 kg/m²       Constitutional  · Appearance: well-nourished, well developed, alert, in no acute distress    HENT  · Head and Face: appears normal    Neck  · Inspection/Palpation: normal appearance, no masses or tenderness  · Lymph Nodes: no lymphadenopathy present  · Thyroid: gland size normal, nontender, no nodules or masses present on palpation    Chest  · Respiratory Effort: breathing normal  · Auscultation: normal breath sounds    Cardiovascular  · Heart:  · Auscultation: regular rate and rhythm without murmur    Breasts  · Inspection of Breasts: breasts symmetrical, no skin changes, no discharge present, nipple appearance normal, no skin retraction present  · Palpation of Breasts and Axillae: no masses present on palpation, no breast tenderness  · Axillary Lymph Nodes: no lymphadenopathy present    Gastrointestinal  · Abdominal Examination: abdomen non-tender to palpation, normal bowel sounds, no masses present  · Liver and spleen: no hepatomegaly present, spleen not palpable  · Hernias: no hernias identified    Genitourinary  · External Genitalia: normal appearance for age, no discharge present, no tenderness present, no inflammatory lesions present, no masses present, no atrophy present  · Vagina: normal vaginal vault without central or paravaginal defects, no discharge present, no inflammatory lesions present, no masses present  · Bladder: non-tender to palpation  · Urethra: appears normal  · Cervix: normal   · Uterus: normal size, shape and consistency  · Adnexa: no adnexal tenderness present, no adnexal masses present  · Perineum: perineum within normal limits, no evidence of trauma, no rashes or skin lesions present  · Anus: anus within normal limits, no hemorrhoids present  · Inguinal Lymph Nodes: no lymphadenopathy present    Skin  · General Inspection: no rash, no lesions identified    Neurologic/Psychiatric  · Mental Status:  · Orientation: grossly oriented to person, place and time  · Mood and Affect: mood normal, affect appropriate    . Assessment:  Routine gynecologic examination  Her current medical status is satisfactory with no evidence of significant gynecologic issues.     Plan:  Counseled re: diet, exercise, healthy lifestyle  Return for yearly wellness visits  [facility]  Call with menses for Mirena

## 2022-11-01 RX ORDER — TRAMADOL HYDROCHLORIDE 50 MG/1
100 TABLET ORAL EVERY 8 HOURS PRN
Qty: 84 TABLET | Refills: 0 | Status: SHIPPED | OUTPATIENT
Start: 2022-11-01 | End: 2022-11-13 | Stop reason: SDUPTHER

## 2022-11-13 DIAGNOSIS — R11.15 EMESIS, PERSISTENT: ICD-10-CM

## 2022-11-13 DIAGNOSIS — K21.9 GASTROESOPHAGEAL REFLUX DISEASE, UNSPECIFIED WHETHER ESOPHAGITIS PRESENT: ICD-10-CM

## 2022-11-13 DIAGNOSIS — G89.29 CHRONIC BILATERAL LOW BACK PAIN WITH LEFT-SIDED SCIATICA: ICD-10-CM

## 2022-11-13 DIAGNOSIS — R52 RADIATING PAIN: ICD-10-CM

## 2022-11-13 DIAGNOSIS — M54.42 CHRONIC BILATERAL LOW BACK PAIN WITH LEFT-SIDED SCIATICA: ICD-10-CM

## 2022-11-13 RX ORDER — PANTOPRAZOLE SODIUM 20 MG/1
20 TABLET, DELAYED RELEASE ORAL DAILY
Qty: 180 TABLET | Refills: 1 | Status: CANCELLED | OUTPATIENT
Start: 2022-11-13

## 2022-11-14 DIAGNOSIS — L02.214 ABSCESS OF LEFT GROIN: ICD-10-CM

## 2022-11-14 NOTE — TELEPHONE ENCOUNTER
LOV 2/25/22  LRF 9/9/22  RTO      Health Maintenance   Topic Date Due    COVID-19 Vaccine (1) Never done    Pneumococcal 0-64 years Vaccine (1 - PCV) Never done    Flu vaccine (1) Never done    Depression Screen  02/25/2023    A1C test (Diabetic or Prediabetic)  07/18/2023    DTaP/Tdap/Td vaccine (2 - Td or Tdap) 02/19/2024    Hepatitis A vaccine  Aged Out    Hib vaccine  Aged Out    Meningococcal (ACWY) vaccine  Aged Out    Varicella vaccine  Discontinued    Hepatitis C screen  Discontinued    HIV screen  Discontinued             (applicable per patient's age: Cancer Screenings, Depression Screening, Fall Risk Screening, Immunizations)    Hemoglobin A1C (%)   Date Value   07/18/2022 5.8   07/21/2021 6.0     LDL Cholesterol (mg/dL)   Date Value   07/18/2022 134 (H)     AST (U/L)   Date Value   07/18/2022 27     ALT (U/L)   Date Value   07/18/2022 30     BUN (mg/dL)   Date Value   07/18/2022 13      (goal A1C is < 7)   (goal LDL is <100) need 30-50% reduction from baseline     BP Readings from Last 3 Encounters:   02/25/22 125/80   07/21/21 126/84   01/28/21 (!) 144/100    (goal /80)      All Future Testing planned in [facility]:  Lab Frequency Next Occurrence       Next Visit Date:  Future Appointments   Date Time Provider Yari Bazan   11/30/2022  3:45 PM MINA Galdamez - NP [facility]            Patient Active Problem List:     Tobacco user     Anxiety state     Sciatica     Lipoma of back     Mild persistent asthma without complication     Synovial cyst of left popliteal space     Chronic back pain     Closed fracture of tuft of distal phalanx of finger     Injury due to fall     Radiating pain     Morbidly obese ([address]

## 2022-11-14 NOTE — TELEPHONE ENCOUNTER
LOV 2/25/22  LRF 11/1/22  RTO      Health Maintenance   Topic Date Due    COVID-19 Vaccine (1) Never done    Pneumococcal 0-64 years Vaccine (1 - PCV) Never done    Flu vaccine (1) Never done    Depression Screen  02/25/2023    A1C test (Diabetic or Prediabetic)  07/18/2023    DTaP/Tdap/Td vaccine (2 - Td or Tdap) 02/19/2024    Hepatitis A vaccine  Aged Out    Hib vaccine  Aged Out    Meningococcal (ACWY) vaccine  Aged Out    Varicella vaccine  Discontinued    Hepatitis C screen  Discontinued    HIV screen  Discontinued             (applicable per patient's age: Cancer Screenings, Depression Screening, Fall Risk Screening, Immunizations)    Hemoglobin A1C (%)   Date Value   07/18/2022 5.8   07/21/2021 6.0     LDL Cholesterol (mg/dL)   Date Value   07/18/2022 134 (H)     AST (U/L)   Date Value   07/18/2022 27     ALT (U/L)   Date Value   07/18/2022 30     BUN (mg/dL)   Date Value   07/18/2022 13      (goal A1C is < 7)   (goal LDL is <100) need 30-50% reduction from baseline     BP Readings from Last 3 Encounters:   02/25/22 125/80   07/21/21 126/84   01/28/21 (!) 144/100    (goal /80)      All Future Testing planned in [facility]:  Lab Frequency Next Occurrence       Next Visit Date:  Future Appointments   Date Time Provider Yari Bazan   11/30/2022  3:45 PM MINA Boogie - [place] [facility] [address]            Patient Active Problem List:     Tobacco user     Anxiety state     Sciatica     Lipoma of back     Mild persistent asthma without complication     Synovial cyst of left popliteal space     Chronic back pain     Closed fracture of tuft of distal phalanx of finger     Injury due to fall     Radiating pain     Morbidly obese ([address]

## 2022-11-15 RX ORDER — TRAMADOL HYDROCHLORIDE 50 MG/1
100 TABLET ORAL EVERY 8 HOURS PRN
Qty: 84 TABLET | Refills: 0 | Status: SHIPPED | OUTPATIENT
Start: 2022-11-15 | End: 2022-11-30 | Stop reason: SDUPTHER

## 2022-11-30 ENCOUNTER — OFFICE VISIT (OUTPATIENT)
Dept: FAMILY MEDICINE CLINIC | Age: 37
End: 2022-11-30
Payer: MEDICARE

## 2022-11-30 VITALS
HEIGHT: 67 IN | WEIGHT: 250 LBS | HEART RATE: 78 BPM | DIASTOLIC BLOOD PRESSURE: 96 MMHG | TEMPERATURE: 97.5 F | SYSTOLIC BLOOD PRESSURE: 142 MMHG | BODY MASS INDEX: 39.24 KG/M2 | OXYGEN SATURATION: 97 %

## 2022-11-30 DIAGNOSIS — R03.0 ELEVATED BP WITHOUT DIAGNOSIS OF HYPERTENSION: ICD-10-CM

## 2022-11-30 DIAGNOSIS — R73.03 PRE-DIABETES: ICD-10-CM

## 2022-11-30 DIAGNOSIS — M54.42 CHRONIC BILATERAL LOW BACK PAIN WITH LEFT-SIDED SCIATICA: Primary | ICD-10-CM

## 2022-11-30 DIAGNOSIS — R52 RADIATING PAIN: ICD-10-CM

## 2022-11-30 DIAGNOSIS — L02.214 ABSCESS OF LEFT GROIN: ICD-10-CM

## 2022-11-30 DIAGNOSIS — G89.29 CHRONIC BILATERAL LOW BACK PAIN WITH LEFT-SIDED SCIATICA: Primary | ICD-10-CM

## 2022-11-30 DIAGNOSIS — M19.90 ARTHRITIS: ICD-10-CM

## 2022-11-30 PROCEDURE — G8484 FLU IMMUNIZE NO ADMIN: HCPCS | Performed by: NURSE PRACTITIONER

## 2022-11-30 PROCEDURE — G8417 CALC BMI ABV UP PARAM F/U: HCPCS | Performed by: NURSE PRACTITIONER

## 2022-11-30 PROCEDURE — 4004F PT TOBACCO SCREEN RCVD TLK: CPT | Performed by: NURSE PRACTITIONER

## 2022-11-30 PROCEDURE — 99214 OFFICE O/P EST MOD 30 MIN: CPT | Performed by: NURSE PRACTITIONER

## 2022-11-30 PROCEDURE — G8427 DOCREV CUR MEDS BY ELIG CLIN: HCPCS | Performed by: NURSE PRACTITIONER

## 2022-11-30 RX ORDER — TRAMADOL HYDROCHLORIDE 50 MG/1
100 TABLET ORAL EVERY 8 HOURS PRN
Qty: 180 TABLET | Refills: 0 | Status: SHIPPED | OUTPATIENT
Start: 2022-11-30 | End: 2022-12-30

## 2022-11-30 RX ORDER — BLOOD PRESSURE TEST KIT
1 KIT MISCELLANEOUS DAILY
Qty: 1 KIT | Refills: 0 | Status: SHIPPED | OUTPATIENT
Start: 2022-11-30 | End: 2023-11-30

## 2022-11-30 SDOH — ECONOMIC STABILITY: FOOD INSECURITY: WITHIN THE PAST 12 MONTHS, THE FOOD YOU BOUGHT JUST DIDN'T LAST AND YOU DIDN'T HAVE MONEY TO GET MORE.: NEVER TRUE

## 2022-11-30 SDOH — ECONOMIC STABILITY: FOOD INSECURITY: WITHIN THE PAST 12 MONTHS, YOU WORRIED THAT YOUR FOOD WOULD RUN OUT BEFORE YOU GOT MONEY TO BUY MORE.: NEVER TRUE

## 2022-11-30 ASSESSMENT — PATIENT HEALTH QUESTIONNAIRE - PHQ9
SUM OF ALL RESPONSES TO PHQ QUESTIONS 1-9: 0
SUM OF ALL RESPONSES TO PHQ9 QUESTIONS 1 & 2: 0
2. FEELING DOWN, DEPRESSED OR HOPELESS: 0
SUM OF ALL RESPONSES TO PHQ QUESTIONS 1-9: 0
1. LITTLE INTEREST OR PLEASURE IN DOING THINGS: 0

## 2022-11-30 ASSESSMENT — SOCIAL DETERMINANTS OF HEALTH (SDOH): HOW HARD IS IT FOR YOU TO PAY FOR THE VERY BASICS LIKE FOOD, HOUSING, MEDICAL CARE, AND HEATING?: NOT HARD AT ALL

## 2022-11-30 NOTE — PROGRESS NOTES
Ryan Phillips (:  1985) is a 42 y.o. male,Established patient, here for evaluation of the following chief complaint(s):  Medication Refill         ASSESSMENT/PLAN:  1. Chronic bilateral low back pain with left-sided sciatica  -     traMADol (ULTRAM) 50 MG tablet; Take 2 tablets by mouth every 8 hours as needed for Pain for up to 30 days. Intended supply: 7 days. Take lowest dose possible to manage pain, Disp-180 tablet, R-0Normal  2. Arthritis  3. Radiating pain  -     traMADol (ULTRAM) 50 MG tablet; Take 2 tablets by mouth every 8 hours as needed for Pain for up to 30 days. Intended supply: 7 days. Take lowest dose possible to manage pain, Disp-180 tablet, R-0Normal  4. Elevated BP without diagnosis of hypertension  -     Blood Pressure KIT; DAILY Starting Wed 2022, Until Thu 2023, For 365 days, Disp-1 kit, R-0, Normal  5. Pre-diabetes  6. Abscess of left groin  -     External Referral To General Surgery    Return in about 3 months (around 2023), or if symptoms worsen or fail to improve, for medication follow up. Subjective   SUBJECTIVE/OBJECTIVE:  Med check for chronic Pain and use of tramadol. Doing ok. Reports tramadol works well for him. Is able to continue working. Bp elevated today. Will order BP cuff and asked him to monitor and send readings to office for review      Review of Systems   Constitutional:  Negative for activity change, appetite change, fatigue and fever. HENT:  Negative for congestion, rhinorrhea and sore throat. Eyes:  Negative for visual disturbance. Respiratory:  Negative for cough and shortness of breath. Cardiovascular:  Negative for chest pain and palpitations. Gastrointestinal:  Negative for abdominal distention, abdominal pain, constipation, diarrhea, nausea and vomiting. Endocrine: Negative for polydipsia, polyphagia and polyuria. Genitourinary:  Negative for dysuria and urgency. Musculoskeletal:  Positive for arthralgias (chronic). Allergic/Immunologic: Negative for immunocompromised state. Neurological:  Negative for syncope, light-headedness and headaches. Psychiatric/Behavioral:  Negative for decreased concentration, dysphoric mood, sleep disturbance and suicidal ideas. The patient is not nervous/anxious. Objective   Physical Exam  Vitals and nursing note reviewed. Constitutional:       General: He is not in acute distress. Appearance: He is not ill-appearing. HENT:      Head: Normocephalic. Nose: Nose normal.      Mouth/Throat:      Lips: Pink. Cardiovascular:      Rate and Rhythm: Normal rate. Pulses:           Carotid pulses are 2+ on the right side and 2+ on the left side. Radial pulses are 2+ on the right side and 2+ on the left side. Heart sounds: No murmur heard. Pulmonary:      Effort: Pulmonary effort is normal. No respiratory distress. Breath sounds: No decreased breath sounds, wheezing, rhonchi or rales. Musculoskeletal:      Right lower leg: No edema. Left lower leg: No edema. Neurological:      Mental Status: He is alert and oriented to person, place, and time. Psychiatric:         Attention and Perception: Attention normal.         Speech: Speech normal.         Behavior: Behavior is cooperative. An electronic signature was used to authenticate this note.     --Jenene Moritz, APRN - [facility]

## 2022-12-04 ENCOUNTER — PATIENT MESSAGE (OUTPATIENT)
Dept: FAMILY MEDICINE CLINIC | Age: 37
End: 2022-12-04

## 2022-12-04 DIAGNOSIS — I10 PRIMARY HYPERTENSION: Primary | ICD-10-CM

## 2022-12-06 NOTE — TELEPHONE ENCOUNTER
From: Suki Avila  To: Suzy Ramirez  Sent: 12/4/2022 5:07 PM EST  Subject: Week 1 blood pressure     Thurs 137/91  Saturday 136/83  Sunday 155/93 ( in asshole mode at time was taken.  Said chest heart.)

## 2022-12-07 DIAGNOSIS — K21.9 GASTROESOPHAGEAL REFLUX DISEASE, UNSPECIFIED WHETHER ESOPHAGITIS PRESENT: ICD-10-CM

## 2022-12-07 DIAGNOSIS — R11.15 EMESIS, PERSISTENT: ICD-10-CM

## 2022-12-07 RX ORDER — PANTOPRAZOLE SODIUM 20 MG/1
20 TABLET, DELAYED RELEASE ORAL DAILY
Qty: 180 TABLET | Refills: 1 | Status: SHIPPED | OUTPATIENT
Start: 2022-12-07

## 2022-12-07 NOTE — TELEPHONE ENCOUNTER
Last visit: 11/30/2022  Last Med refill: 10/5/2022  Does patient have enough medication for 72 hours: Yes    Next Visit Date:  Future Appointments   Date Time Provider Yari Bazan   5/30/2023  7:15 AM MINA Pereira NP [facility] [address] Maintenance   Topic Date Due    COVID-19 Vaccine (1) Never done    Flu vaccine (1) 06/30/2023 (Originally 8/1/2022)    Pneumococcal 0-64 years Vaccine (1 - PCV) 11/30/2023 (Originally 9/11/1991)    A1C test (Diabetic or Prediabetic)  07/18/2023    Depression Screen  11/30/2023    DTaP/Tdap/Td vaccine (2 - Td or Tdap) 02/19/2024    Hepatitis A vaccine  Aged Out    Hib vaccine  Aged Out    Meningococcal (ACWY) vaccine  Aged Out    Varicella vaccine  Discontinued    Hepatitis C screen  Discontinued    HIV screen  Discontinued       Hemoglobin A1C (%)   Date Value   07/18/2022 5.8   07/21/2021 6.0             ( goal A1C is < 7)   No results found for: LABMICR  LDL Cholesterol (mg/dL)   Date Value   07/18/2022 134 (H)   07/21/2021 122       (goal LDL is <100)   AST (U/L)   Date Value   07/18/2022 27     ALT (U/L)   Date Value   07/18/2022 30     BUN (mg/dL)   Date Value   07/18/2022 13     BP Readings from Last 3 Encounters:   11/30/22 (!) 142/96   02/25/22 125/80   07/21/21 126/84          (goal 120/80)    All Future Testing planned in [facility]  Lab Frequency Next Occurrence               Patient Active Problem List:     Tobacco user     Anxiety state     Sciatica     Lipoma of back     Mild persistent asthma without complication     Synovial cyst of left popliteal space     Chronic back pain     Closed fracture of tuft of distal phalanx of finger     Injury due to fall     Radiating pain     Morbidly obese (HCC)

## 2022-12-08 RX ORDER — LISINOPRIL 10 MG/1
10 TABLET ORAL DAILY
Qty: 30 TABLET | Refills: 1 | Status: SHIPPED | OUTPATIENT
Start: 2022-12-08 | End: 2023-12-08

## 2022-12-12 ASSESSMENT — ENCOUNTER SYMPTOMS
RHINORRHEA: 0
SORE THROAT: 0
ABDOMINAL DISTENTION: 0
CONSTIPATION: 0
NAUSEA: 0
SHORTNESS OF BREATH: 0
DIARRHEA: 0
VOMITING: 0
ABDOMINAL PAIN: 0
COUGH: 0

## 2022-12-25 DIAGNOSIS — M54.42 CHRONIC BILATERAL LOW BACK PAIN WITH LEFT-SIDED SCIATICA: ICD-10-CM

## 2022-12-25 DIAGNOSIS — G89.29 CHRONIC BILATERAL LOW BACK PAIN WITH LEFT-SIDED SCIATICA: ICD-10-CM

## 2022-12-25 DIAGNOSIS — R52 RADIATING PAIN: ICD-10-CM

## 2022-12-27 RX ORDER — TRAMADOL HYDROCHLORIDE 50 MG/1
100 TABLET ORAL EVERY 8 HOURS PRN
Qty: 180 TABLET | Refills: 0 | Status: SHIPPED | OUTPATIENT
Start: 2022-12-27 | End: 2023-01-26

## 2022-12-27 NOTE — TELEPHONE ENCOUNTER
LOV 11/30/22  LRF 11/30/22    Next appt 5/30/22      Health Maintenance   Topic Date Due    COVID-19 Vaccine (1) Never done    Flu vaccine (1) 06/30/2023 (Originally 8/1/2022)    Pneumococcal 0-64 years Vaccine (1 - PCV) 11/30/2023 (Originally [birth year]1989)    A1C test (Diabetic or Prediabetic)  07/18/2023    Depression Screen  11/30/2023    DTaP/Tdap/Td vaccine (2 - Td or Tdap) 02/19/2024    Hepatitis A vaccine  Aged Out    Hib vaccine  Aged Out    Meningococcal (ACWY) vaccine  Aged Out    Varicella vaccine  Discontinued    Hepatitis C screen  Discontinued    HIV screen  Discontinued             (applicable per patient's age: Cancer Screenings, Depression Screening, Fall Risk Screening, Immunizations)    Hemoglobin A1C (%)   Date Value   07/18/2022 5.8   07/21/2021 6.0     LDL Cholesterol (mg/dL)   Date Value   07/18/2022 134 (H)     AST (U/L)   Date Value   07/18/2022 27     ALT (U/L)   Date Value   07/18/2022 30     BUN (mg/dL)   Date Value   07/18/2022 13      (goal A1C is < 7)   (goal LDL is <100) need 30-50% reduction from baseline     BP Readings from Last 3 Encounters:   11/30/22 (!) 142/96   02/25/22 125/80   07/21/21 126/84    (goal /80)      All Future Testing planned in [facility]:  Lab Frequency Next Occurrence       Next Visit Date:  Future Appointments   Date Time Provider Yari Bazan   5/30/2023  7:15 AM MINA Hood - JEREMY [facility]            Patient Active Problem List:     Tobacco user     Anxiety state     Sciatica     Lipoma of back     Mild persistent asthma without complication     Synovial cyst of left popliteal space     Chronic back pain     Closed fracture of tuft of distal phalanx of finger     Injury due to fall     Radiating pain     Morbidly obese ([address]

## 2023-01-19 DIAGNOSIS — G89.29 CHRONIC BILATERAL LOW BACK PAIN WITH LEFT-SIDED SCIATICA: ICD-10-CM

## 2023-01-19 DIAGNOSIS — M54.42 CHRONIC BILATERAL LOW BACK PAIN WITH LEFT-SIDED SCIATICA: ICD-10-CM

## 2023-01-19 DIAGNOSIS — R52 RADIATING PAIN: ICD-10-CM

## 2023-01-19 RX ORDER — TRAMADOL HYDROCHLORIDE 50 MG/1
100 TABLET ORAL EVERY 8 HOURS PRN
Qty: 180 TABLET | Refills: 0 | Status: SHIPPED | OUTPATIENT
Start: 2023-01-19 | End: 2023-02-18

## 2023-01-19 NOTE — TELEPHONE ENCOUNTER
LOV 11/30/22  LRF 12/27/22    Next appt 5/30/23      Health Maintenance   Topic Date Due    COVID-19 Vaccine (1) Never done    Flu vaccine (1) 06/30/2023 (Originally 8/1/2022)    Pneumococcal 0-64 years Vaccine (1 - PCV) 11/30/2023 (Originally 9/11/1991)    A1C test (Diabetic or Prediabetic)  07/18/2023    Depression Screen  11/30/2023    DTaP/Tdap/Td vaccine (2 - Td or Tdap) 02/19/2024    Hepatitis A vaccine  Aged Out    Hib vaccine  Aged Out    Meningococcal (ACWY) vaccine  Aged Out    Varicella vaccine  Discontinued    Hepatitis C screen  Discontinued    HIV screen  Discontinued             (applicable per patient's age: Cancer Screenings, Depression Screening, Fall Risk Screening, Immunizations)    Hemoglobin A1C (%)   Date Value   07/18/2022 5.8   07/21/2021 6.0     LDL Cholesterol (mg/dL)   Date Value   07/18/2022 134 (H)     AST (U/L)   Date Value   07/18/2022 27     ALT (U/L)   Date Value   07/18/2022 30     BUN (mg/dL)   Date Value   07/18/2022 13      (goal A1C is < 7)   (goal LDL is <100) need 30-50% reduction from baseline     BP Readings from Last 3 Encounters:   11/30/22 (!) 142/96   02/25/22 125/80   07/21/21 126/84    (goal /80)      All Future Testing planned in [facility]:  Lab Frequency Next Occurrence       Next Visit Date:  Future Appointments   Date Time Provider Yari Bazan   5/30/2023  7:15 AM MINA Montes - JEREMY [place] [address] [facility]            Patient Active Problem List:     Tobacco user     Anxiety state     Sciatica     Lipoma of back     Mild persistent asthma without complication     Synovial cyst of left popliteal space     Chronic back pain     Closed fracture of tuft of distal phalanx of finger     Injury due to fall     Radiating pain     Morbidly obese ([address]

## 2023-02-13 DIAGNOSIS — I10 PRIMARY HYPERTENSION: ICD-10-CM

## 2023-02-14 RX ORDER — LISINOPRIL 10 MG/1
10 TABLET ORAL DAILY
Qty: 30 TABLET | Refills: 5 | Status: SHIPPED | OUTPATIENT
Start: 2023-02-14 | End: 2024-02-14

## 2023-02-14 NOTE — TELEPHONE ENCOUNTER
Last visit: 11-30-22  Last Med refill: 12-8-22  Does patient have enough medication for 72 hours: Yes    Next Visit Date:  Future Appointments   Date Time Provider Yari Bazan   5/30/2023  7:15 AM MINA Yates NP [facility] [address] Maintenance   Topic Date Due    COVID-19 Vaccine (1) Never done    Flu vaccine (1) 06/30/2023 (Originally 8/1/2022)    Pneumococcal 0-64 years Vaccine (1 - PCV) 11/30/2023 (Originally 9/11/1991)    A1C test (Diabetic or Prediabetic)  07/18/2023    Depression Screen  11/30/2023    DTaP/Tdap/Td vaccine (2 - Td or Tdap) 02/19/2024    Hepatitis A vaccine  Aged Out    Hib vaccine  Aged Out    Meningococcal (ACWY) vaccine  Aged Out    Varicella vaccine  Discontinued    Hepatitis C screen  Discontinued    HIV screen  Discontinued       Hemoglobin A1C (%)   Date Value   07/18/2022 5.8   07/21/2021 6.0             ( goal A1C is < 7)   No results found for: LABMICR  LDL Cholesterol (mg/dL)   Date Value   07/18/2022 134 (H)   07/21/2021 122       (goal LDL is <100)   AST (U/L)   Date Value   07/18/2022 27     ALT (U/L)   Date Value   07/18/2022 30     BUN (mg/dL)   Date Value   07/18/2022 13     BP Readings from Last 3 Encounters:   11/30/22 (!) 142/96   02/25/22 125/80   07/21/21 126/84          (goal 120/80)    All Future Testing planned in [facility]  Lab Frequency Next Occurrence               Patient Active Problem List:     Tobacco user     Anxiety state     Sciatica     Lipoma of back     Mild persistent asthma without complication     Synovial cyst of left popliteal space     Chronic back pain     Closed fracture of tuft of distal phalanx of finger     Injury due to fall     Radiating pain     Morbidly obese (HCC)

## 2023-02-15 DIAGNOSIS — G89.29 CHRONIC BILATERAL LOW BACK PAIN WITH LEFT-SIDED SCIATICA: ICD-10-CM

## 2023-02-15 DIAGNOSIS — M54.42 CHRONIC BILATERAL LOW BACK PAIN WITH LEFT-SIDED SCIATICA: ICD-10-CM

## 2023-02-15 DIAGNOSIS — R52 RADIATING PAIN: ICD-10-CM

## 2023-02-16 ENCOUNTER — PATIENT MESSAGE (OUTPATIENT)
Dept: FAMILY MEDICINE CLINIC | Age: 38
End: 2023-02-16

## 2023-02-16 DIAGNOSIS — I10 PRIMARY HYPERTENSION: Primary | ICD-10-CM

## 2023-02-16 DIAGNOSIS — L02.214 ABSCESS OF LEFT GROIN: ICD-10-CM

## 2023-02-16 RX ORDER — TRAMADOL HYDROCHLORIDE 50 MG/1
100 TABLET ORAL EVERY 8 HOURS PRN
Qty: 180 TABLET | Refills: 0 | Status: SHIPPED | OUTPATIENT
Start: 2023-02-16 | End: 2023-03-18

## 2023-02-16 NOTE — TELEPHONE ENCOUNTER
LOV 11/30/22  LRF 11/15/22    Next appt 5/30/23      Health Maintenance   Topic Date Due    COVID-19 Vaccine (1) Never done    Flu vaccine (1) 06/30/2023 (Originally 8/1/2022)    Pneumococcal 0-64 years Vaccine (1 - PCV) 11/30/2023 (Originally 9/11/1991)    A1C test (Diabetic or Prediabetic)  07/18/2023    Depression Screen  11/30/2023    DTaP/Tdap/Td vaccine (2 - Td or Tdap) 02/19/2024    Hepatitis A vaccine  Aged Out    Hib vaccine  Aged Out    Meningococcal (ACWY) vaccine  Aged Out    Varicella vaccine  Discontinued    Hepatitis C screen  Discontinued    HIV screen  Discontinued             (applicable per patient's age: Cancer Screenings, Depression Screening, Fall Risk Screening, Immunizations)    Hemoglobin A1C (%)   Date Value   07/18/2022 5.8   07/21/2021 6.0     LDL Cholesterol (mg/dL)   Date Value   07/18/2022 134 (H)     AST (U/L)   Date Value   07/18/2022 27     ALT (U/L)   Date Value   07/18/2022 30     BUN (mg/dL)   Date Value   07/18/2022 13      (goal A1C is < 7)   (goal LDL is <100) need 30-50% reduction from baseline     BP Readings from Last 3 Encounters:   11/30/22 (!) 142/96   02/25/22 125/80   07/21/21 126/84    (goal /80)      All Future Testing planned in [facility]:  Lab Frequency Next Occurrence       Next Visit Date:  Future Appointments   Date Time Provider Yari Bazan   5/30/2023  7:15 AM MINA Fields - JEREMY [place] [address] [facility]            Patient Active Problem List:     Tobacco user     Anxiety state     Sciatica     Lipoma of back     Mild persistent asthma without complication     Synovial cyst of left popliteal space     Chronic back pain     Closed fracture of tuft of distal phalanx of finger     Injury due to fall     Radiating pain     Morbidly obese ([address]

## 2023-02-16 NOTE — TELEPHONE ENCOUNTER
LOV 11/30/22  LRF 1/19/23    Next appt 5/30/23      Health Maintenance   Topic Date Due    COVID-19 Vaccine (1) Never done    Flu vaccine (1) 06/30/2023 (Originally 8/1/2022)    Pneumococcal 0-64 years Vaccine (1 - PCV) 11/30/2023 (Originally 9/11/1991)    A1C test (Diabetic or Prediabetic)  07/18/2023    Depression Screen  11/30/2023    DTaP/Tdap/Td vaccine (2 - Td or Tdap) 02/19/2024    Hepatitis A vaccine  Aged Out    Hib vaccine  Aged Out    Meningococcal (ACWY) vaccine  Aged Out    Varicella vaccine  Discontinued    Hepatitis C screen  Discontinued    HIV screen  Discontinued             (applicable per patient's age: Cancer Screenings, Depression Screening, Fall Risk Screening, Immunizations)    Hemoglobin A1C (%)   Date Value   07/18/2022 5.8   07/21/2021 6.0     LDL Cholesterol (mg/dL)   Date Value   07/18/2022 134 (H)     AST (U/L)   Date Value   07/18/2022 27     ALT (U/L)   Date Value   07/18/2022 30     BUN (mg/dL)   Date Value   07/18/2022 13      (goal A1C is < 7)   (goal LDL is <100) need 30-50% reduction from baseline     BP Readings from Last 3 Encounters:   11/30/22 (!) 142/96   02/25/22 125/80   07/21/21 126/84    (goal /80)      All Future Testing planned in [facility]:  Lab Frequency Next Occurrence       Next Visit Date:  Future Appointments   Date Time Provider Yari Bazan   5/30/2023  7:15 AM MINA Dang - JEREMY [place] [address]            Patient Active Problem List:     Tobacco user     Anxiety state     Sciatica     Lipoma of back     Mild persistent asthma without complication     Synovial cyst of left popliteal space     Chronic back pain     Closed fracture of tuft of distal phalanx of finger     Injury due to fall     Radiating pain     Morbidly obese ([address]

## 2023-02-16 NOTE — TELEPHONE ENCOUNTER
From: Christopher Abdullahi  To:  Eulogio Carson  Sent: 2/16/2023 11:24 AM EST  Subject: Blood pressure readings this week     149/99   141/83  139/92   148/91  155/99

## 2023-02-21 DIAGNOSIS — R11.15 EMESIS, PERSISTENT: ICD-10-CM

## 2023-02-21 DIAGNOSIS — K21.9 GASTROESOPHAGEAL REFLUX DISEASE, UNSPECIFIED WHETHER ESOPHAGITIS PRESENT: ICD-10-CM

## 2023-02-23 RX ORDER — PANTOPRAZOLE SODIUM 20 MG/1
20 TABLET, DELAYED RELEASE ORAL DAILY
Qty: 180 TABLET | Refills: 1 | OUTPATIENT
Start: 2023-02-23

## 2023-02-23 RX ORDER — LISINOPRIL AND HYDROCHLOROTHIAZIDE 12.5; 1 MG/1; MG/1
1 TABLET ORAL DAILY
Qty: 30 TABLET | Refills: 1 | Status: SHIPPED | OUTPATIENT
Start: 2023-02-23 | End: 2024-02-23

## 2023-03-16 DIAGNOSIS — M54.42 CHRONIC BILATERAL LOW BACK PAIN WITH LEFT-SIDED SCIATICA: ICD-10-CM

## 2023-03-16 DIAGNOSIS — R52 RADIATING PAIN: ICD-10-CM

## 2023-03-16 DIAGNOSIS — G89.29 CHRONIC BILATERAL LOW BACK PAIN WITH LEFT-SIDED SCIATICA: ICD-10-CM

## 2023-03-16 NOTE — TELEPHONE ENCOUNTER
LOV 11/30/22  LRF 2/16/23    Next appt 5/30/23      Health Maintenance   Topic Date Due    COVID-19 Vaccine (1) Never done    Flu vaccine (1) 06/30/2023 (Originally 8/1/2022)    Pneumococcal 0-64 years Vaccine (1 - PCV) 11/30/2023 (Originally 9/11/1991)    A1C test (Diabetic or Prediabetic)  07/18/2023    Depression Screen  11/30/2023    DTaP/Tdap/Td vaccine (2 - Td or Tdap) 02/19/2024    Hepatitis A vaccine  Aged Out    Hib vaccine  Aged Out    Meningococcal (ACWY) vaccine  Aged Out    Varicella vaccine  Discontinued    Hepatitis C screen  Discontinued    HIV screen  Discontinued             (applicable per patient's age: Cancer Screenings, Depression Screening, Fall Risk Screening, Immunizations)    Hemoglobin A1C (%)   Date Value   07/18/2022 5.8   07/21/2021 6.0     LDL Cholesterol (mg/dL)   Date Value   07/18/2022 134 (H)     AST (U/L)   Date Value   07/18/2022 27     ALT (U/L)   Date Value   07/18/2022 30     BUN (mg/dL)   Date Value   07/18/2022 13      (goal A1C is < 7)   (goal LDL is <100) need 30-50% reduction from baseline     BP Readings from Last 3 Encounters:   11/30/22 (!) 142/96   02/25/22 125/80   07/21/21 126/84    (goal /80)      All Future Testing planned in [facility]:  Lab Frequency Next Occurrence       Next Visit Date:  Future Appointments   Date Time Provider Yari Bazan   5/30/2023  7:15 AM MINA Dwyer NP            Patient Active Problem List:     Tobacco user     Anxiety state     Sciatica     Lipoma of back     Mild persistent asthma without complication     Synovial cyst of left popliteal space     Chronic back pain     Closed fracture of tuft of distal phalanx of finger     Injury due to fall     Radiating pain     Morbidly obese ([address]

## 2023-03-17 RX ORDER — TRAMADOL HYDROCHLORIDE 50 MG/1
100 TABLET ORAL EVERY 8 HOURS PRN
Qty: 180 TABLET | Refills: 0 | Status: SHIPPED | OUTPATIENT
Start: 2023-03-17 | End: 2023-04-16

## 2023-04-28 ENCOUNTER — PATIENT MESSAGE (OUTPATIENT)
Dept: FAMILY MEDICINE CLINIC | Age: 38
End: 2023-04-28

## 2023-04-28 DIAGNOSIS — G89.29 CHRONIC BILATERAL LOW BACK PAIN WITH LEFT-SIDED SCIATICA: ICD-10-CM

## 2023-04-28 DIAGNOSIS — M54.42 CHRONIC BILATERAL LOW BACK PAIN WITH LEFT-SIDED SCIATICA: ICD-10-CM

## 2023-04-28 DIAGNOSIS — R52 RADIATING PAIN: ICD-10-CM

## 2023-04-29 DIAGNOSIS — I10 PRIMARY HYPERTENSION: ICD-10-CM

## 2023-04-30 DIAGNOSIS — I10 PRIMARY HYPERTENSION: ICD-10-CM

## 2023-05-01 RX ORDER — LISINOPRIL AND HYDROCHLOROTHIAZIDE 12.5; 1 MG/1; MG/1
TABLET ORAL
Qty: 30 TABLET | Refills: 1 | OUTPATIENT
Start: 2023-05-01

## 2023-05-01 NOTE — TELEPHONE ENCOUNTER
LOV 11/30/22  LRF 2/23/23  RTO 3 months     Health Maintenance   Topic Date Due    COVID-19 Vaccine (1) Never done    Pneumococcal 0-64 years Vaccine (1 - PCV) 11/30/2023 (Originally 9/11/1991)    A1C test (Diabetic or Prediabetic)  07/18/2023    Flu vaccine (Season Ended) 08/01/2023    Depression Screen  11/30/2023    DTaP/Tdap/Td vaccine (2 - Td or Tdap) 02/19/2024    Hepatitis A vaccine  Aged Out    Hib vaccine  Aged Out    Meningococcal (ACWY) vaccine  Aged Out    Varicella vaccine  Discontinued    Hepatitis C screen  Discontinued    HIV screen  Discontinued             (applicable per patient's age: Cancer Screenings, Depression Screening, Fall Risk Screening, Immunizations)    Hemoglobin A1C (%)   Date Value   07/18/2022 5.8   07/21/2021 6.0     LDL Cholesterol (mg/dL)   Date Value   07/18/2022 134 (H)     AST (U/L)   Date Value   07/18/2022 27     ALT (U/L)   Date Value   07/18/2022 30     BUN (mg/dL)   Date Value   07/18/2022 13      (goal A1C is < 7)   (goal LDL is <100) need 30-50% reduction from baseline     BP Readings from Last 3 Encounters:   11/30/22 (!) 142/96   02/25/22 125/80   07/21/21 126/84    (goal /80)      All Future Testing planned in [facility]:  Lab Frequency Next Occurrence       Next Visit Date:  Future Appointments   Date Time Provider Yari Bazan   5/30/2023  7:15 AM MINA Wang NP [place] [address]            Patient Active Problem List:     Tobacco user     Anxiety state     Sciatica     Lipoma of back     Mild persistent asthma without complication     Synovial cyst of left popliteal space     Chronic back pain     Closed fracture of tuft of distal phalanx of finger     Injury due to fall     Radiating pain     Morbidly obese ([address]

## 2023-05-02 RX ORDER — LISINOPRIL AND HYDROCHLOROTHIAZIDE 12.5; 1 MG/1; MG/1
1 TABLET ORAL DAILY
Qty: 30 TABLET | Refills: 5 | Status: SHIPPED | OUTPATIENT
Start: 2023-05-02 | End: 2024-05-01

## 2023-05-05 RX ORDER — TRAMADOL HYDROCHLORIDE 50 MG/1
100 TABLET ORAL EVERY 8 HOURS PRN
Qty: 180 TABLET | Refills: 0 | Status: SHIPPED | OUTPATIENT
Start: 2023-05-05 | End: 2023-06-04

## 2023-05-22 DIAGNOSIS — L02.214 ABSCESS OF LEFT GROIN: ICD-10-CM

## 2023-05-22 DIAGNOSIS — K21.9 GASTROESOPHAGEAL REFLUX DISEASE, UNSPECIFIED WHETHER ESOPHAGITIS PRESENT: ICD-10-CM

## 2023-05-22 DIAGNOSIS — R11.15 EMESIS, PERSISTENT: ICD-10-CM

## 2023-05-23 RX ORDER — PANTOPRAZOLE SODIUM 40 MG/1
40 TABLET, DELAYED RELEASE ORAL DAILY
Qty: 90 TABLET | Refills: 3 | Status: SHIPPED | OUTPATIENT
Start: 2023-05-23 | End: 2024-05-22

## 2023-06-04 DIAGNOSIS — M54.42 CHRONIC BILATERAL LOW BACK PAIN WITH LEFT-SIDED SCIATICA: ICD-10-CM

## 2023-06-04 DIAGNOSIS — R52 RADIATING PAIN: ICD-10-CM

## 2023-06-04 DIAGNOSIS — G89.29 CHRONIC BILATERAL LOW BACK PAIN WITH LEFT-SIDED SCIATICA: ICD-10-CM

## 2023-06-05 RX ORDER — TRAMADOL HYDROCHLORIDE 50 MG/1
100 TABLET ORAL EVERY 8 HOURS PRN
Qty: 180 TABLET | Refills: 0 | Status: SHIPPED | OUTPATIENT
Start: 2023-06-05 | End: 2023-07-05

## 2023-06-12 PROBLEM — I10 ESSENTIAL HYPERTENSION: Status: ACTIVE | Noted: 2023-06-12

## 2023-06-12 PROBLEM — R73.03 PRE-DIABETES: Status: ACTIVE | Noted: 2023-06-12

## 2023-07-03 DIAGNOSIS — G89.29 CHRONIC BILATERAL LOW BACK PAIN WITH LEFT-SIDED SCIATICA: ICD-10-CM

## 2023-07-03 DIAGNOSIS — M54.42 CHRONIC BILATERAL LOW BACK PAIN WITH LEFT-SIDED SCIATICA: ICD-10-CM

## 2023-07-03 DIAGNOSIS — R52 RADIATING PAIN: ICD-10-CM

## 2023-07-05 RX ORDER — TRAMADOL HYDROCHLORIDE 50 MG/1
100 TABLET ORAL EVERY 8 HOURS PRN
Qty: 180 TABLET | Refills: 0 | Status: SHIPPED | OUTPATIENT
Start: 2023-07-05 | End: 2023-08-18 | Stop reason: SDUPTHER

## 2023-07-05 NOTE — TELEPHONE ENCOUNTER
LOV  5/30/23  RTO not scheduled  LRF 6/5/23          Controlled Substance Monitoring:    Acute and Chronic Pain Monitoring:   RX Monitoring 1/7/2020   Attestation -   Periodic Controlled Substance Monitoring Possible medication side effects, risk of tolerance/dependence & alternative treatments discussed. ;No signs of potential drug abuse or diversion identified.

## 2023-07-05 NOTE — TELEPHONE ENCOUNTER
Please call. Needs OV scheduled to establish care with another provider in office.   Must have appointment scheduled for additional refills as controlled substance

## 2023-08-01 DIAGNOSIS — M54.42 CHRONIC BILATERAL LOW BACK PAIN WITH LEFT-SIDED SCIATICA: ICD-10-CM

## 2023-08-01 DIAGNOSIS — Z51.81 ENCOUNTER FOR THERAPEUTIC DRUG LEVEL MONITORING: Primary | ICD-10-CM

## 2023-08-01 DIAGNOSIS — R52 RADIATING PAIN: ICD-10-CM

## 2023-08-01 DIAGNOSIS — G89.29 CHRONIC BILATERAL LOW BACK PAIN WITH LEFT-SIDED SCIATICA: ICD-10-CM

## 2023-08-01 PROCEDURE — 80305 DRUG TEST PRSMV DIR OPT OBS: CPT | Performed by: NURSE PRACTITIONER

## 2023-08-01 NOTE — TELEPHONE ENCOUNTER
Medication contract current. Updated urine drug screening needed? LOV 5/30/23  RTO 3 months; Pt is on wait list for new provider for Sept.   LRF 7/5/23  CSM 5/2/23    Health Maintenance   Topic Date Due    Flu vaccine (1) Never done    A1C test (Diabetic or Prediabetic)  07/18/2023    Pneumococcal 0-64 years Vaccine (1 - PCV) 11/30/2023 (Originally 9/11/1991)    COVID-19 Vaccine (1) 05/30/2024 (Originally 3/11/1986)    DTaP/Tdap/Td vaccine (2 - Td or Tdap) 02/19/2024    Depression Screen  05/30/2024    Hepatitis A vaccine  Aged Out    Hib vaccine  Aged Out    Meningococcal (ACWY) vaccine  Aged Out    Varicella vaccine  Discontinued    Hepatitis C screen  Discontinued    HIV screen  Discontinued             (applicable per patient's age: Cancer Screenings, Depression Screening, Fall Risk Screening, Immunizations)    Hemoglobin A1C (%)   Date Value   07/18/2022 5.8   07/21/2021 6.0     LDL Cholesterol (mg/dL)   Date Value   07/18/2022 134 (H)     AST (U/L)   Date Value   07/18/2022 27     ALT (U/L)   Date Value   07/18/2022 30     BUN (mg/dL)   Date Value   07/18/2022 13      (goal A1C is < 7)   (goal LDL is <100) need 30-50% reduction from baseline     BP Readings from Last 3 Encounters:   05/30/23 122/82   11/30/22 (!) 142/96   02/25/22 125/80    (goal /80)      All Future Testing planned in [facility]:  Lab Frequency Next Occurrence   CBC Once 05/30/2023   Comprehensive Metabolic Panel Once 24/16/8406   Lipid, Fasting Once 05/30/2023   Hemoglobin A1C Once 05/30/2023       Next Visit Date:  No future appointments.          Patient Active Problem List:     Tobacco user     Anxiety state     Sciatica     Lipoma of back     Mild persistent asthma without complication     Synovial cyst of left popliteal space     Chronic back pain     Closed fracture of tuft of distal phalanx of finger     Injury due to fall     Radiating pain     Morbidly obese (HCC)     Essential hypertension     Pre-diabetes

## 2023-08-07 RX ORDER — TRAMADOL HYDROCHLORIDE 50 MG/1
100 TABLET ORAL EVERY 8 HOURS PRN
Qty: 180 TABLET | Refills: 0 | OUTPATIENT
Start: 2023-08-07 | End: 2023-09-06

## 2023-08-09 NOTE — TELEPHONE ENCOUNTER
Spoke with pt wife. They are aware that he needs a UDS. Pt tested Positive for covid this week will get it done once he feels better.

## 2023-08-16 ENCOUNTER — HOSPITAL ENCOUNTER (OUTPATIENT)
Age: 38
Setting detail: SPECIMEN
Discharge: HOME | End: 2023-08-16

## 2023-08-16 DIAGNOSIS — Z51.81 ENCOUNTER FOR THERAPEUTIC DRUG LEVEL MONITORING: ICD-10-CM

## 2023-08-16 LAB
AMPHET UR QL SCN: NEGATIVE
BARBITURATES UR QL SCN: NEGATIVE
BENZODIAZ UR QL: NEGATIVE
CANNABINOIDS UR QL SCN: POSITIVE
COCAINE UR QL SCN: NEGATIVE
FENTANYL UR QL: NEGATIVE
METHADONE UR QL: NEGATIVE
OPIATES UR QL SCN: NEGATIVE
OXYCODONE UR QL SCN: NEGATIVE
PCP UR QL SCN: NEGATIVE
TEST INFORMATION: ABNORMAL

## 2023-08-17 ENCOUNTER — TELEPHONE (OUTPATIENT)
Dept: FAMILY MEDICINE CLINIC | Age: 38
End: 2023-08-17

## 2023-08-17 DIAGNOSIS — R52 RADIATING PAIN: ICD-10-CM

## 2023-08-17 DIAGNOSIS — M54.42 CHRONIC BILATERAL LOW BACK PAIN WITH LEFT-SIDED SCIATICA: ICD-10-CM

## 2023-08-17 DIAGNOSIS — G89.29 CHRONIC BILATERAL LOW BACK PAIN WITH LEFT-SIDED SCIATICA: ICD-10-CM

## 2023-08-17 NOTE — TELEPHONE ENCOUNTER
Patient's spouse calling verifying Urine results have been received. Patient has been without his pain medication since 08/07/2023.

## 2023-08-18 RX ORDER — TRAMADOL HYDROCHLORIDE 50 MG/1
100 TABLET ORAL EVERY 8 HOURS PRN
Qty: 180 TABLET | Refills: 0 | Status: SHIPPED | OUTPATIENT
Start: 2023-08-18 | End: 2023-09-17

## 2023-08-18 NOTE — TELEPHONE ENCOUNTER
UDS and med contract reviewed. Please advise a one month supply has been sent. No further refills will be sent until evaluated and seen by new PCP on 9/19/23.

## 2023-09-13 NOTE — PROGRESS NOTES
[facility]  [facility] [address]  [address] [address]  Dept: [phone]        CHIEF COMPLAINT:      Chief Complaint   Patient presents with    Established New Doctor       SUBJECTIVE      Jonathan Rowland is a 34 y.o. male who presents to establish as new patient. Previously saw Zayda Giraldo NP.     HTN- is on linsinopril. Does check BP at home and not over 130/80. Takes lisinopril daily. Denies headaches, chest pain, SOB. Had labs ordered in May that were not completed. Protonix-gets acid reflux with vomiting. Has not ever had EGD. Even on protonix still has vomiting. Patient open to a referral for an EGD today. Takes ultram for chronic back pain. 2020 MRI showed -Congenital spinal canal stenosis. Small left foraminal disc extrusion at L3-L4 resulting in severe left  foraminal stenosis and compression of the exiting left L3 nerve root. Moderate right foraminal stenosis at L3-L4. Small left paracentral disc protrusion at L5-S1 resulting in mild left  lateral recess stenosis. Dr. Karina Hurt saw in January 2021-stated \"good candidate for diagnostic lumbar facets at bilateral L3-4, L4-5 and L5-S1 to see if pain is facet mediated, if this is beneficial would be a candidate for radiofrequency ablation. \" Patient has not seen Dr. Karina Hurt since. Got referral for Dr. Bhavani Cespedes in 8/2021. Received injections with Melvina and was suggested another procedure-patient declined. Feb 2022 He then was refusing to go to neurosurgery and pain management for [facility] Np. Rodrick Llanes has been re prescribing Ultram for several years. Patient is currently refusing to go to pain management and physical therapy. States he has tried everything else percocet, gabapentin, kriss and nothing helped.  I explained that I would not be providing chronic long term pain medications and gave him the option of trying different medication, referral to pain management, or finding a provider who would

## 2023-09-19 ENCOUNTER — HOSPITAL ENCOUNTER (OUTPATIENT)
Age: 38
Setting detail: SPECIMEN
Discharge: HOME | End: 2023-09-19

## 2023-09-19 ENCOUNTER — OFFICE VISIT (OUTPATIENT)
Dept: FAMILY MEDICINE CLINIC | Age: 38
End: 2023-09-19
Payer: COMMERCIAL

## 2023-09-19 VITALS
HEART RATE: 81 BPM | OXYGEN SATURATION: 95 % | TEMPERATURE: 97.2 F | HEIGHT: 67 IN | SYSTOLIC BLOOD PRESSURE: 118 MMHG | DIASTOLIC BLOOD PRESSURE: 72 MMHG | WEIGHT: 253 LBS | BODY MASS INDEX: 39.71 KG/M2

## 2023-09-19 DIAGNOSIS — K21.9 GASTROESOPHAGEAL REFLUX DISEASE, UNSPECIFIED WHETHER ESOPHAGITIS PRESENT: ICD-10-CM

## 2023-09-19 DIAGNOSIS — M54.42 CHRONIC BILATERAL LOW BACK PAIN WITH LEFT-SIDED SCIATICA: ICD-10-CM

## 2023-09-19 DIAGNOSIS — I10 ESSENTIAL HYPERTENSION: Primary | ICD-10-CM

## 2023-09-19 DIAGNOSIS — G89.29 CHRONIC BILATERAL LOW BACK PAIN WITH LEFT-SIDED SCIATICA: ICD-10-CM

## 2023-09-19 DIAGNOSIS — R11.15 EMESIS, PERSISTENT: ICD-10-CM

## 2023-09-19 DIAGNOSIS — R52 RADIATING PAIN: ICD-10-CM

## 2023-09-19 DIAGNOSIS — R73.03 PRE-DIABETES: ICD-10-CM

## 2023-09-19 DIAGNOSIS — Z13.220 LIPID SCREENING: ICD-10-CM

## 2023-09-19 DIAGNOSIS — I10 ESSENTIAL HYPERTENSION: ICD-10-CM

## 2023-09-19 DIAGNOSIS — E66.01 MORBIDLY OBESE (HCC): ICD-10-CM

## 2023-09-19 LAB
ALBUMIN SERPL-MCNC: 4.1 G/DL (ref 3.5–5.2)
ALBUMIN/GLOB SERPL: 1 {RATIO}
ALP SERPL-CCNC: 79 U/L (ref 40–129)
ALT SERPL-CCNC: 53 U/L (ref 10–50)
ANION GAP SERPL CALCULATED.3IONS-SCNC: 16 MMOL/L (ref 9–16)
AST SERPL-CCNC: 42 U/L (ref 10–50)
BILIRUB SERPL-MCNC: 0.2 MG/DL (ref 0–1.2)
BUN SERPL-MCNC: 8 MG/DL (ref 6–20)
CALCIUM SERPL-MCNC: 9 MG/DL (ref 8.6–10.4)
CHLORIDE SERPL-SCNC: 102 MMOL/L (ref 98–107)
CHOLEST SERPL-MCNC: 165 MG/DL (ref 0–199)
CHOLESTEROL/HDL RATIO: 7
CO2 SERPL-SCNC: 21 MMOL/L (ref 20–31)
CREAT SERPL-MCNC: 0.8 MG/DL (ref 0.7–1.2)
ERYTHROCYTE [DISTWIDTH] IN BLOOD BY AUTOMATED COUNT: 12.7 % (ref 11.8–14.4)
GFR SERPL CREATININE-BSD FRML MDRD: >60 ML/MIN/1.73M2
GLUCOSE SERPL-MCNC: 79 MG/DL (ref 74–99)
HCT VFR BLD AUTO: 45.2 % (ref 40.7–50.3)
HDLC SERPL-MCNC: 25 MG/DL (ref 0–40)
HGB BLD-MCNC: 15.1 G/DL (ref 13–17)
LDLC SERPL CALC-MCNC: 104 MG/DL (ref 0–100)
MCH RBC QN AUTO: 31.5 PG (ref 25.2–33.5)
MCHC RBC AUTO-ENTMCNC: 33.4 G/DL (ref 28.4–34.8)
MCV RBC AUTO: 94.2 FL (ref 82.6–102.9)
NRBC BLD-RTO: 0 PER 100 WBC
PLATELET # BLD AUTO: 277 K/UL (ref 138–453)
PMV BLD AUTO: 11.8 FL (ref 8.1–13.5)
POTASSIUM SERPL-SCNC: 4.2 MMOL/L (ref 3.7–5.3)
PROT SERPL-MCNC: 7 G/DL (ref 6.6–8.7)
RBC # BLD AUTO: 4.8 M/UL (ref 4.21–5.77)
SODIUM SERPL-SCNC: 139 MMOL/L (ref 136–145)
TRIGL SERPL-MCNC: 180 MG/DL (ref 0–149)
VLDLC SERPL CALC-MCNC: 36 MG/DL
WBC OTHER # BLD: 7.4 K/UL (ref 3.5–11.3)

## 2023-09-19 PROCEDURE — 3074F SYST BP LT 130 MM HG: CPT

## 2023-09-19 PROCEDURE — 3078F DIAST BP <80 MM HG: CPT

## 2023-09-19 PROCEDURE — 99213 OFFICE O/P EST LOW 20 MIN: CPT

## 2023-09-19 RX ORDER — TRAMADOL HYDROCHLORIDE 50 MG/1
100 TABLET ORAL EVERY 8 HOURS PRN
Qty: 140 TABLET | Refills: 0 | Status: SHIPPED | OUTPATIENT
Start: 2023-09-19 | End: 2023-10-19

## 2023-09-19 ASSESSMENT — ENCOUNTER SYMPTOMS
CONSTIPATION: 0
BACK PAIN: 0
DIARRHEA: 0
COLOR CHANGE: 0
SHORTNESS OF BREATH: 0
CHEST TIGHTNESS: 0
WHEEZING: 0

## 2023-09-19 ASSESSMENT — ANXIETY QUESTIONNAIRES
7. FEELING AFRAID AS IF SOMETHING AWFUL MIGHT HAPPEN: 0
4. TROUBLE RELAXING: 0
1. FEELING NERVOUS, ANXIOUS, OR ON EDGE: 0
GAD7 TOTAL SCORE: 0
2. NOT BEING ABLE TO STOP OR CONTROL WORRYING: 0
5. BEING SO RESTLESS THAT IT IS HARD TO SIT STILL: 0
IF YOU CHECKED OFF ANY PROBLEMS ON THIS QUESTIONNAIRE, HOW DIFFICULT HAVE THESE PROBLEMS MADE IT FOR YOU TO DO YOUR WORK, TAKE CARE OF THINGS AT HOME, OR GET ALONG WITH OTHER PEOPLE: NOT DIFFICULT AT ALL
3. WORRYING TOO MUCH ABOUT DIFFERENT THINGS: 0
6. BECOMING EASILY ANNOYED OR IRRITABLE: 0

## 2023-09-19 ASSESSMENT — PATIENT HEALTH QUESTIONNAIRE - PHQ9
1. LITTLE INTEREST OR PLEASURE IN DOING THINGS: 0
SUM OF ALL RESPONSES TO PHQ9 QUESTIONS 1 & 2: 0
2. FEELING DOWN, DEPRESSED OR HOPELESS: 0
SUM OF ALL RESPONSES TO PHQ QUESTIONS 1-9: 0

## 2023-09-20 LAB
EST. AVERAGE GLUCOSE BLD GHB EST-MCNC: 123 MG/DL
HBA1C MFR BLD: 5.9 % (ref 4–6)

## 2023-09-20 NOTE — RESULT ENCOUNTER NOTE
Blood counts are normal. Your A1C that reflects your 3-month blood sugar average is 5.9. This is defined as prediabetes. Your A1c has been in the prediabetes range for several years. We will continue to check yearly A1c's. Work on [employer] decreasing carbs, sugars and increasing protein, vegetables, and healthy fats (Fish, lean meats). Consistent exercise can also help. Your cholesterol could also use improvement. Medication not indicated yet. Working on diet and exercise will help this.

## 2023-09-21 NOTE — RESULT ENCOUNTER NOTE
Cholesterol is slightly elevated. Not indicated to start medications. Blood counts, kidney and liver function all are normal. A1C which shows your 3 month average of your blood sugar. It was 5.9. Anything above 5.7 is considered pre-diabetes. You have been in the pre-diabetes range for several years. No medication is indicated yet. Would recommend dietary changes at this time. Increasing exercise to daily. Try to decrease unhealthy sugars in diet. Increase protein, vegetables, and fruits. Weight loss would likely help both your cholesterol and your blood sugars. We will continue to check these labs yearly.

## 2023-10-12 DIAGNOSIS — G89.29 CHRONIC BILATERAL LOW BACK PAIN WITH LEFT-SIDED SCIATICA: ICD-10-CM

## 2023-10-12 DIAGNOSIS — R52 RADIATING PAIN: ICD-10-CM

## 2023-10-12 DIAGNOSIS — M54.42 CHRONIC BILATERAL LOW BACK PAIN WITH LEFT-SIDED SCIATICA: ICD-10-CM

## 2023-10-12 NOTE — TELEPHONE ENCOUNTER
LOV 9/19/2023     RTO n/a  LRF 9/19/2023          Controlled Substance Monitoring:    Acute and Chronic Pain Monitoring:   RX Monitoring Periodic Controlled Substance Monitoring   9/19/2023   7:48 AM Possible medication side effects, risk of tolerance/dependence & alternative treatments discussed. ;No signs of potential drug abuse or diversion identified.

## 2023-10-17 RX ORDER — TRAMADOL HYDROCHLORIDE 50 MG/1
100 TABLET ORAL EVERY 8 HOURS PRN
Qty: 70 TABLET | Refills: 0 | Status: SHIPPED | OUTPATIENT
Start: 2023-10-17 | End: 2023-11-10 | Stop reason: SDUPTHER

## 2023-11-01 ENCOUNTER — PATIENT MESSAGE (OUTPATIENT)
Dept: FAMILY MEDICINE CLINIC | Age: 38
End: 2023-11-01

## 2023-11-01 DIAGNOSIS — G89.29 CHRONIC BILATERAL LOW BACK PAIN WITH LEFT-SIDED SCIATICA: Primary | ICD-10-CM

## 2023-11-01 DIAGNOSIS — M54.42 CHRONIC BILATERAL LOW BACK PAIN WITH LEFT-SIDED SCIATICA: Primary | ICD-10-CM

## 2023-11-01 NOTE — TELEPHONE ENCOUNTER
From: Hortencia Otero  To: Lu Muse  Sent: 11/1/2023 9:44 AM EDT  Subject: Pain management referral.     I do believe I would like to be referred to pain management if it is possible still please.

## 2023-11-10 ENCOUNTER — TELEPHONE (OUTPATIENT)
Dept: PAIN MANAGEMENT | Age: 38
End: 2023-11-10

## 2023-11-10 DIAGNOSIS — G89.29 CHRONIC BILATERAL LOW BACK PAIN WITH LEFT-SIDED SCIATICA: ICD-10-CM

## 2023-11-10 DIAGNOSIS — M54.42 CHRONIC BILATERAL LOW BACK PAIN WITH LEFT-SIDED SCIATICA: ICD-10-CM

## 2023-11-10 DIAGNOSIS — R52 RADIATING PAIN: ICD-10-CM

## 2023-11-10 RX ORDER — TRAMADOL HYDROCHLORIDE 50 MG/1
100 TABLET ORAL EVERY 8 HOURS PRN
Qty: 40 TABLET | Refills: 0 | Status: SHIPPED | OUTPATIENT
Start: 2023-11-10 | End: 2023-12-10

## 2023-11-10 NOTE — TELEPHONE ENCOUNTER
Called referral number was told to call Ramirez Farah at [phone] to find out more info for new pt's. Writer called number and LM for office to writer back. Will try again later to get a hold of office.

## 2023-11-10 NOTE — TELEPHONE ENCOUNTER
LOV 9/19/23 NTP   RTO n/a  LRF 10/17/23          Controlled Substance Monitoring:    Acute and Chronic Pain Monitoring:   RX Monitoring Periodic Controlled Substance Monitoring   9/19/2023   7:48 AM Possible medication side effects, risk of tolerance/dependence & alternative treatments discussed. ;No signs of potential drug abuse or diversion identified.

## 2023-12-10 DIAGNOSIS — R52 RADIATING PAIN: ICD-10-CM

## 2023-12-10 DIAGNOSIS — M54.42 CHRONIC BILATERAL LOW BACK PAIN WITH LEFT-SIDED SCIATICA: ICD-10-CM

## 2023-12-10 DIAGNOSIS — G89.29 CHRONIC BILATERAL LOW BACK PAIN WITH LEFT-SIDED SCIATICA: ICD-10-CM

## 2023-12-11 NOTE — TELEPHONE ENCOUNTER
LOV 9/19/23   RTO none  LRF 11/10/23          Controlled Substance Monitoring:    Acute and Chronic Pain Monitoring:   RX Monitoring Periodic Controlled Substance Monitoring   9/19/2023   7:48 AM Possible medication side effects, risk of tolerance/dependence & alternative treatments discussed.;No signs of potential drug abuse or diversion identified.

## 2023-12-12 RX ORDER — TRAMADOL HYDROCHLORIDE 50 MG/1
100 TABLET ORAL EVERY 8 HOURS PRN
Qty: 40 TABLET | Refills: 0 | OUTPATIENT
Start: 2023-12-12 | End: 2024-01-11

## 2023-12-12 NOTE — TELEPHONE ENCOUNTER
Refused. Please notify patient 40 was last weaning dose. Verify he has an appointment with pain as none in system on my end.

## 2024-01-16 NOTE — TELEPHONE ENCOUNTER
Last OARRS Review-09/15/2020  Last Office Visit-09/15/2020  Return To Office-  Next Appointment Date-None   Last Refill Date-10/26/2020  30 tabs   Number of Refills Given- 0      Health Maintenance   Topic Date Due    Flu vaccine (1) 09/01/2020    Pneumococcal 0-64 years Vaccine (1 of 1 - PPSV23) 03/15/2021 (Originally 9/11/1991)    HIV screen  09/15/2021 (Originally 9/11/1998)    DTaP/Tdap/Td vaccine (2 - Td) 02/19/2024    Hepatitis A vaccine  Aged Out    Hepatitis B vaccine  Aged Out    Hib vaccine  Aged Out    Meningococcal (ACWY) vaccine  Aged Out    Varicella vaccine  Discontinued             (applicable per patient's age: Cancer Screenings, Depression Screening, Fall Risk Screening, Immunizations)    LDL Cholesterol (mg/dL)   Date Value   03/03/2017 100     AST (U/L)   Date Value   03/03/2017 18     ALT (U/L)   Date Value   03/03/2017 23     BUN (mg/dL)   Date Value   03/03/2017 9      (goal A1C is < 7)   (goal LDL is <100) need 30-50% reduction from baseline     BP Readings from Last 3 Encounters:   09/15/20 120/80   07/31/20 129/81   07/31/20 (!) 129/96    (goal /80)      All Future Testing planned in [facility]:      Next Visit Date:  No future appointments.          Patient Active Problem List:     Tobacco user     Anxiety state     Sciatica     Lipoma of back     Mild persistent asthma without complication     Synovial cyst of left popliteal space     Chronic back pain     Closed fracture of tuft of distal phalanx of finger     Injury due to fall     Radiating pain     Morbidly obese (HCC) Negative Screen

## 2024-03-02 ENCOUNTER — TELEPHONE (OUTPATIENT)
Dept: GASTROENTEROLOGY | Age: 39
End: 2024-03-02

## 2024-03-02 NOTE — TELEPHONE ENCOUNTER
I tried calling Winifred on the listed home phone number, voicemail was not setup. Letter placed in mail for follow up. 3/2/2024 @ 12:50 pm SL

## 2024-03-22 DIAGNOSIS — K21.9 GASTROESOPHAGEAL REFLUX DISEASE, UNSPECIFIED WHETHER ESOPHAGITIS PRESENT: ICD-10-CM

## 2024-03-22 DIAGNOSIS — R11.15 EMESIS, PERSISTENT: ICD-10-CM

## 2024-03-25 RX ORDER — PANTOPRAZOLE SODIUM 40 MG/1
40 TABLET, DELAYED RELEASE ORAL DAILY
Qty: 90 TABLET | Refills: 3 | Status: SHIPPED | OUTPATIENT
Start: 2024-03-25 | End: 2025-03-25

## 2024-03-25 NOTE — TELEPHONE ENCOUNTER
LOV 9/19/23  RTO 3 months  LRF 5/23/23    Health Maintenance   Topic Date Due    Pneumococcal 0-64 years Vaccine (1 of 2 - PCV) Never done    DTaP/Tdap/Td vaccine (2 - Td or Tdap) 02/19/2024    COVID-19 Vaccine (1) 05/30/2024 (Originally [birth year]/1984)    Hepatitis B vaccine (1 of 3 - 3-dose series) 09/19/2024 (Originally 1985)    Flu vaccine (1) 09/19/2024 (Originally 8/1/2023)    A1C test (Diabetic or Prediabetic)  09/19/2024    Depression Screen  09/19/2024    Lipids  09/19/2028    Hepatitis A vaccine  Aged Out    Hib vaccine  Aged Out    HPV vaccine  Aged Out    Polio vaccine  Aged Out    Meningococcal (ACWY) vaccine  Aged Out    Varicella vaccine  Discontinued    Hepatitis C screen  Discontinued    HIV screen  Discontinued             (applicable per patient's age: Cancer Screenings, Depression Screening, Fall Risk Screening, Immunizations)    Hemoglobin A1C (%)   Date Value   09/19/2023 5.9   07/18/2022 5.8   07/21/2021 6.0     LDL Cholesterol (mg/dL)   Date Value   09/19/2023 104 (H)     AST (U/L)   Date Value   09/19/2023 42     ALT (U/L)   Date Value   09/19/2023 53 (H)     BUN (mg/dL)   Date Value   09/19/2023 8      (goal A1C is < 7)   (goal LDL is <100) need 30-50% reduction from baseline     BP Readings from Last 3 Encounters:   09/19/23 118/72   05/30/23 122/82   11/30/22 (!) 142/96    (goal /80)      All Future Testing planned in [facility]:  Lab Frequency Next Occurrence       Next Visit Date:  No future appointments.         Patient Active Problem List:     Tobacco user     Anxiety state     Sciatica     Lipoma of back     Mild persistent asthma without complication     Synovial cyst of left popliteal space     Chronic back pain     Closed fracture of tuft of distal phalanx of finger     Injury due to fall     Radiating pain     Morbidly obese (HCC)     Essential hypertension     Pre-diabetes

## 2024-07-16 NOTE — TELEPHONE ENCOUNTER
Last visit: 09/15/20  Last Med refill: 12/16/20      Next Visit Date:  Future Appointments   Date Time Provider Yari Hortensia   1/28/2021  8:45 AM Jhonny Shah MD [address] [facility] Maintenance   Topic Date Due    Hepatitis C screen  1985    Flu vaccine (1) 09/01/2020    Pneumococcal 0-64 years Vaccine (1 of 1 - PPSV23) 03/15/2021 (Originally 9/11/1989)    HIV screen  09/15/2021 (Originally 9/11/1998)    DTaP/Tdap/Td vaccine (2 - Td) 02/19/2024    Hepatitis A vaccine  Aged Out    Hepatitis B vaccine  Aged Out    Hib vaccine  Aged Out    Meningococcal (ACWY) vaccine  Aged Out    Varicella vaccine  Discontinued       No results found for: LABA1C          ( goal A1C is < 7)   No results found for: LABMICR  LDL Cholesterol (mg/dL)   Date Value   03/03/2017 100       (goal LDL is <100)   AST (U/L)   Date Value   03/03/2017 18     ALT (U/L)   Date Value   03/03/2017 23     BUN (mg/dL)   Date Value   03/03/2017 9     BP Readings from Last 3 Encounters:   09/15/20 120/80   07/31/20 129/81   07/31/20 (!) 129/96          (goal 120/80)    All Future Testing planned in [facility]              Patient Active Problem List:     Tobacco user     Anxiety state     Sciatica     Lipoma of back     Mild persistent asthma without complication     Synovial cyst of left popliteal space     Chronic back pain     Closed fracture of tuft of distal phalanx of finger     Injury due to fall     Radiating pain     Morbidly obese ([address] 4 = No assist / stand by assistance

## 2024-07-30 DIAGNOSIS — I10 ESSENTIAL HYPERTENSION: ICD-10-CM

## 2024-07-30 NOTE — TELEPHONE ENCOUNTER
LOV 9/19/23   RTO mychart message sent to pt   LRF 5/30/23          Controlled Substance Monitoring:    Acute and Chronic Pain Monitoring:   RX Monitoring Periodic Controlled Substance Monitoring   9/19/2023   7:48 AM Possible medication side effects, risk of tolerance/dependence & alternative treatments discussed.;No signs of potential drug abuse or diversion identified.

## 2024-07-31 RX ORDER — LISINOPRIL AND HYDROCHLOROTHIAZIDE 12.5; 1 MG/1; MG/1
1 TABLET ORAL DAILY
Qty: 90 TABLET | Refills: 3 | Status: SHIPPED | OUTPATIENT
Start: 2024-07-31 | End: 2025-07-31

## 2024-07-31 NOTE — TELEPHONE ENCOUNTER
LOV 9/19/2023   RTO 9/26/2024  LRF 5/30/2024            Controlled Substance Monitoring:    Acute and Chronic Pain Monitoring:   RX Monitoring Periodic Controlled Substance Monitoring   9/19/2023   7:48 AM Possible medication side effects, risk of tolerance/dependence & alternative treatments discussed.;No signs of potential drug abuse or diversion identified.

## 2024-10-29 DIAGNOSIS — I10 ESSENTIAL HYPERTENSION: ICD-10-CM

## 2024-10-29 DIAGNOSIS — R11.15 EMESIS, PERSISTENT: ICD-10-CM

## 2024-10-29 DIAGNOSIS — K21.9 GASTROESOPHAGEAL REFLUX DISEASE, UNSPECIFIED WHETHER ESOPHAGITIS PRESENT: ICD-10-CM

## 2024-10-30 PROBLEM — K21.9 GERD (GASTROESOPHAGEAL REFLUX DISEASE): Status: ACTIVE | Noted: 2024-10-30

## 2024-10-30 RX ORDER — LISINOPRIL AND HYDROCHLOROTHIAZIDE 10; 12.5 MG/1; MG/1
1 TABLET ORAL DAILY
Qty: 90 TABLET | Refills: 3 | OUTPATIENT
Start: 2024-10-30 | End: 2025-10-30

## 2024-10-30 RX ORDER — PANTOPRAZOLE SODIUM 40 MG/1
40 TABLET, DELAYED RELEASE ORAL DAILY
Qty: 90 TABLET | Refills: 3 | OUTPATIENT
Start: 2024-10-30 | End: 2025-10-30

## 2024-10-30 NOTE — TELEPHONE ENCOUNTER
Last Visit:  Visit date not found     Next Visit Date:  Future Appointments   Date Time Provider Department Center   10/30/2024  4:30 PM Skyla Vasques MD [place] [facility] E[facility] DEP

## 2024-12-03 DIAGNOSIS — K21.9 GASTROESOPHAGEAL REFLUX DISEASE, UNSPECIFIED WHETHER ESOPHAGITIS PRESENT: ICD-10-CM

## 2024-12-03 DIAGNOSIS — R11.15 EMESIS, PERSISTENT: ICD-10-CM

## 2024-12-04 RX ORDER — PANTOPRAZOLE SODIUM 40 MG/1
40 TABLET, DELAYED RELEASE ORAL DAILY
Qty: 90 TABLET | Refills: 3 | Status: SHIPPED | OUTPATIENT
Start: 2024-12-04 | End: 2025-12-04

## 2024-12-04 NOTE — TELEPHONE ENCOUNTER
LOV 9/19/23   RTO none  LRF 3/25/24          Controlled Substance Monitoring:    Acute and Chronic Pain Monitoring:   RX Monitoring Periodic Controlled Substance Monitoring   9/19/2023   7:48 AM Possible medication side effects, risk of tolerance/dependence & alternative treatments discussed.;No signs of potential drug abuse or diversion identified.

## 2025-01-27 ENCOUNTER — HOSPITAL ENCOUNTER (EMERGENCY)
Facility: CLINIC | Age: 40
Discharge: HOME | End: 2025-01-27
Attending: STUDENT IN AN ORGANIZED HEALTH CARE EDUCATION/TRAINING PROGRAM
Payer: MEDICAID

## 2025-01-27 VITALS
SYSTOLIC BLOOD PRESSURE: 163 MMHG | TEMPERATURE: 98.1 F | RESPIRATION RATE: 18 BRPM | HEART RATE: 54 BPM | WEIGHT: 250 LBS | OXYGEN SATURATION: 96 % | HEIGHT: 67 IN | DIASTOLIC BLOOD PRESSURE: 88 MMHG | BODY MASS INDEX: 39.24 KG/M2

## 2025-01-27 DIAGNOSIS — I10 HYPERTENSION, UNSPECIFIED TYPE: ICD-10-CM

## 2025-01-27 DIAGNOSIS — K08.89 PAIN, DENTAL: Primary | ICD-10-CM

## 2025-01-27 DIAGNOSIS — B34.9 VIRAL ILLNESS: ICD-10-CM

## 2025-01-27 LAB
FLUAV AG SPEC QL: NEGATIVE
FLUBV AG SPEC QL: NEGATIVE
SARS-COV-2 RDRP RESP QL NAA+PROBE: NOT DETECTED
SPECIMEN DESCRIPTION: NORMAL

## 2025-01-27 PROCEDURE — 99283 EMERGENCY DEPT VISIT LOW MDM: CPT

## 2025-01-27 PROCEDURE — 87804 INFLUENZA ASSAY W/OPTIC: CPT

## 2025-01-27 PROCEDURE — 87635 SARS-COV-2 COVID-19 AMP PRB: CPT

## 2025-01-27 RX ORDER — IBUPROFEN 800 MG/1
800 TABLET, FILM COATED ORAL EVERY 8 HOURS PRN
Qty: 30 TABLET | Refills: 0 | Status: SHIPPED | OUTPATIENT
Start: 2025-01-27

## 2025-01-27 RX ORDER — PENICILLIN V POTASSIUM 500 MG/1
500 TABLET, FILM COATED ORAL 4 TIMES DAILY
Qty: 28 TABLET | Refills: 0 | Status: SHIPPED | OUTPATIENT
Start: 2025-01-27 | End: 2025-02-03

## 2025-01-27 ASSESSMENT — PAIN - FUNCTIONAL ASSESSMENT
PAIN_FUNCTIONAL_ASSESSMENT: 0-10
PAIN_FUNCTIONAL_ASSESSMENT: ACTIVITIES ARE NOT PREVENTED

## 2025-01-27 ASSESSMENT — PAIN DESCRIPTION - FREQUENCY: FREQUENCY: CONTINUOUS

## 2025-01-27 ASSESSMENT — PAIN SCALES - GENERAL: PAINLEVEL_OUTOF10: 4

## 2025-01-27 ASSESSMENT — PAIN DESCRIPTION - LOCATION: LOCATION: TEETH;JAW

## 2025-01-27 ASSESSMENT — PAIN DESCRIPTION - ONSET: ONSET: ON-GOING

## 2025-01-27 ASSESSMENT — PAIN DESCRIPTION - ORIENTATION: ORIENTATION: RIGHT

## 2025-01-27 ASSESSMENT — PAIN DESCRIPTION - DESCRIPTORS: DESCRIPTORS: ACHING;SORE

## 2025-01-27 ASSESSMENT — ENCOUNTER SYMPTOMS: COUGH: 1

## 2025-01-27 ASSESSMENT — PAIN DESCRIPTION - PAIN TYPE: TYPE: ACUTE PAIN

## 2025-01-27 NOTE — ED PROVIDER NOTES
[facility]  eMERGENCY dEPARTMENT eNCOUnter   Independent Attestation     Pt Name: Ryland Joshi  MRN: [id number]  Birthdate 1985  Date of evaluation: 1/27/25    Note Started: 4:47 PM EST    I was personally available for consultation in the Emergency Department. I have reviewed the chart and agree with the documentation as recorded by the MLP, including the assessment, treatment plan and disposition.     Ryland Joshi is a 39 y.o. male who presents with Dental Pain      Vitals:   Vitals:    01/27/25 1547   BP: (!) 163/88   Pulse: 54   Resp: 18   Temp: 98.1 °F (36.7 °C)   TempSrc: Oral   SpO2: 96%   Weight: 113.4 kg (250 lb)   Height: 1.702 m (5' 7\")       Impression:   1. Pain, dental    2. Viral illness    3. Hypertension, unspecified type          Merle Burks DO  Attending Emergency  Physician        Merle Burks DO  01/28/25 [id number]

## 2025-01-27 NOTE — DISCHARGE INSTRUCTIONS
Your blood pressure was elevated today.  He will need follow-up with this at a primary care doctor.  Please call [phone]/same-day to set up an appointment with a new PCP    There is a dentist listed below if you would like to follow-up with.  Otherwise you can call the number on the back of your card to see where your insurance would be excepted    [facility]  [phone] [address] [place]. [address]    9-1 on Saturdays  MUST HAVE AN APPT      Soft diet for comfort.  Rinse your mouth with water after eating.    Return to the emergency room for increased pain, swelling, difficulty breathing, difficulty swallowing, fever or other emergent concerns

## 2025-01-27 NOTE — ED PROVIDER NOTES
EMERGENCY DEPARTMENT ENCOUNTER    Pt Name: Ryland Joshi  MRN: [id number]  Birthdate 1985  Date of evaluation: 1/27/25  CHIEF COMPLAINT       Chief Complaint   Patient presents with    Dental Pain     HISTORY OF PRESENT ILLNESS   Patient is a 39-year-old male who presents with his family for evaluation of right-sided lower dental pain a cough and nasal congestion.  He has had the dental pain for approximately a year.  The patient states that he knows he needs to get the tooth pulled however the only dentist in the area that covers his insurance he does not care for.  He has not had any fevers or chills.  No difficulty breathing or swallowing.             REVIEW OF SYSTEMS     Review of Systems   Constitutional:  Negative for chills and fever.   HENT:  Positive for congestion and dental problem.    Respiratory:  Positive for cough.      PASTMEDICAL HISTORY     Past Medical History:   Diagnosis Date    Essential hypertension 6/12/2023     Past Problem List  Patient Active Problem List   Diagnosis Code    Tobacco user Z72.0    Anxiety state F41.1    Sciatica M54.30    Lipoma of back D17.1    Mild persistent asthma without complication J45.30    Synovial cyst of left popliteal space M71.22    Chronic back pain M54.9, G89.29    Closed fracture of tuft of distal phalanx of finger S62.639A    Injury due to fall W19.XXXA    Radiating pain R52    Morbidly obese E66.01    Essential hypertension I10    Pre-diabetes R73.03    GERD (gastroesophageal reflux disease) K21.9     SURGICAL HISTORY       Past Surgical History:   Procedure Laterality Date    CYST REMOVAL      FEMUR FRACTURE SURGERY      NERVE BLOCK  07/17/2020    Procedure: LUMBAR TRANSFORAMINAL # 1 L3 & L4 (Left )     PAIN MANAGEMENT PROCEDURE Left 7/17/2020    LUMBAR TRANSFORAMINAL # 1 L3 & L4 performed by Kay Lopez MD at [facility] OR    PAIN MANAGEMENT PROCEDURE  07/31/2020    : LUMBAR TRANSFORAMINAL - #2 L3-L4 LEFT (Left )     PAIN MANAGEMENT

## 2025-01-27 NOTE — ED NOTES
Pt presents to ED c/o right lower dental pain. Pt has cracked/missing teeth. Swelling noted in right lower gum line. Pt arrives A/Ox4, afebrile, PWD, PMS intact. Pt resting on stretcher with call light in reach.

## 2025-06-17 ENCOUNTER — HOSPITAL ENCOUNTER (EMERGENCY)
Facility: CLINIC | Age: 40
Discharge: HOME | End: 2025-06-17
Attending: EMERGENCY MEDICINE
Payer: MEDICAID

## 2025-06-17 ENCOUNTER — APPOINTMENT (OUTPATIENT)
Dept: GENERAL RADIOLOGY | Facility: CLINIC | Age: 40
End: 2025-06-17
Payer: MEDICAID

## 2025-06-17 VITALS
TEMPERATURE: 97.3 F | HEIGHT: 67 IN | HEART RATE: 100 BPM | RESPIRATION RATE: 17 BRPM | BODY MASS INDEX: 39.24 KG/M2 | WEIGHT: 250 LBS | DIASTOLIC BLOOD PRESSURE: 79 MMHG | SYSTOLIC BLOOD PRESSURE: 133 MMHG | OXYGEN SATURATION: 95 %

## 2025-06-17 DIAGNOSIS — M79.642 HAND PAIN, LEFT: Primary | ICD-10-CM

## 2025-06-17 PROCEDURE — 73110 X-RAY EXAM OF WRIST: CPT

## 2025-06-17 PROCEDURE — 6360000002 HC RX W HCPCS: Performed by: EMERGENCY MEDICINE

## 2025-06-17 PROCEDURE — 73130 X-RAY EXAM OF HAND: CPT

## 2025-06-17 PROCEDURE — 96372 THER/PROPH/DIAG INJ SC/IM: CPT

## 2025-06-17 PROCEDURE — 99284 EMERGENCY DEPT VISIT MOD MDM: CPT

## 2025-06-17 RX ORDER — PREDNISONE 50 MG/1
50 TABLET ORAL DAILY
Qty: 5 TABLET | Refills: 0 | Status: SHIPPED | OUTPATIENT
Start: 2025-06-17 | End: 2025-06-22

## 2025-06-17 RX ORDER — KETOROLAC TROMETHAMINE 15 MG/ML
15 INJECTION, SOLUTION INTRAMUSCULAR; INTRAVENOUS ONCE
Status: COMPLETED | OUTPATIENT
Start: 2025-06-17 | End: 2025-06-17

## 2025-06-17 RX ORDER — NAPROXEN 500 MG/1
500 TABLET ORAL 2 TIMES DAILY WITH MEALS
Qty: 60 TABLET | Refills: 1 | Status: SHIPPED | OUTPATIENT
Start: 2025-06-17

## 2025-06-17 RX ADMIN — KETOROLAC TROMETHAMINE 15 MG: 15 INJECTION, SOLUTION INTRAMUSCULAR; INTRAVENOUS at 16:02

## 2025-06-17 ASSESSMENT — PAIN SCALES - GENERAL: PAINLEVEL_OUTOF10: 6

## 2025-06-17 ASSESSMENT — PAIN - FUNCTIONAL ASSESSMENT: PAIN_FUNCTIONAL_ASSESSMENT: 0-10

## 2025-06-17 NOTE — ED PROVIDER NOTES
[facility]  [address] [address]  Phone: [phone]      Pt Name: Ryland Joshi  MRN: [id number]  Birthdate 1985  Date of evaluation: 6/17/25    CHIEF COMPLAINT       Chief Complaint   Patient presents with    Wrist Pain    Hand Pain       PAST MEDICAL HISTORY    has a past medical history of Essential hypertension.    SURGICAL HISTORY      has a past surgical history that includes Femur fracture surgery; cyst removal; Nerve Block (07/17/2020); Pain management procedure (Left, 7/17/2020); Pain management procedure (07/31/2020); and Pain management procedure (Left, 7/31/2020).    CURRENT MEDICATIONS       Discharge Medication List as of 6/17/2025  4:30 PM        CONTINUE these medications which have NOT CHANGED    Details   ibuprofen (ADVIL;MOTRIN) 800 MG tablet Take 1 tablet by mouth every 8 hours as needed for Pain, Disp-30 tablet, R-0Normal      pantoprazole (PROTONIX) 40 MG tablet Take 1 tablet by mouth daily, Disp-90 tablet, R-3Normal      lisinopril-hydroCHLOROthiazide (PRINZIDE;ZESTORETIC) 10-12.5 MG per tablet Take 1 tablet by mouth daily, Disp-90 tablet, R-3Normal             ALLERGIES     has no known allergies.    Vitals:    06/17/25 1528 06/17/25 1529   BP:  133/79   Pulse: (!) 103 100   Resp: 17    Temp:  97.3 °F (36.3 °C)   TempSrc:  Oral   SpO2: 95%    Weight: 113.4 kg (250 lb)    Height: 1.702 m (5' 7\")        ED Course as of 06/17/25 1730   Tue Jun 17, 2025   1622 XR HAND LEFT (MIN 3 VIEWS) [TS]   1623 XR WRIST LEFT (MIN 3 VIEWS) [TS]   1623 XR HAND LEFT (MIN 3 VIEWS)  Imaging was reviewed, no acute bony abnormalities, fractures, dislocations in either the hand or the wrist. [TS]      ED Course User Index  [TS] Ian Mary APRN - CNP       FINAL IMPRESSION      1. Hand pain, left          DISPOSITION/PLAN   DISPOSITION Decision To Discharge 06/17/2025 04:28:40 PM   DISPOSITION CONDITION Stable             PATIENT REFERRED TO:  ortho appointment 
Decision To Discharge 06/17/2025 04:28:40 PM   DISPOSITION CONDITION Stable           PATIENT REFERRED TO:  ortho appointment tomorrow            DISCHARGE MEDICATIONS:  New Prescriptions    NAPROXEN (NAPROSYN) 500 MG TABLET    Take 1 tablet by mouth 2 times daily (with meals)    PREDNISONE (DELTASONE) 50 MG TABLET    Take 1 tablet by mouth daily for 5 days     Controlled Substances Monitoring:     RX Monitoring Periodic Controlled Substance Monitoring   9/19/2023   7:48 AM Possible medication side effects, risk of tolerance/dependence & alternative treatments discussed.;No signs of potential drug abuse or diversion identified.       (Please note that portions of this note were completed with a voice recognition program.  Efforts were made to edit the dictations but occasionally words are mis-transcribed.)    MINA Bergman CNP (electronically signed)         Ian Mary APRN - CNP  06/17/25 [id number]

## 2025-06-17 NOTE — DISCHARGE INSTRUCTIONS
Begin taking naproxen instead of ibuprofen/Motrin.  Start taking prednisone today once daily for 5 days.  Follow-up with Ortho tomorrow as scheduled.  Continue using the wrist splint you came in with.

## 2025-06-18 ENCOUNTER — OFFICE VISIT (OUTPATIENT)
Dept: ORTHOPEDIC SURGERY | Age: 40
End: 2025-06-18
Payer: MEDICAID

## 2025-06-18 VITALS — OXYGEN SATURATION: 98 % | BODY MASS INDEX: 37.51 KG/M2 | RESPIRATION RATE: 16 BRPM | HEIGHT: 67 IN | WEIGHT: 239 LBS

## 2025-06-18 DIAGNOSIS — M77.8 TENDINITIS OF FINGER OF LEFT HAND: ICD-10-CM

## 2025-06-18 DIAGNOSIS — G56.03 BILATERAL CARPAL TUNNEL SYNDROME: Primary | ICD-10-CM

## 2025-06-18 PROCEDURE — 99204 OFFICE O/P NEW MOD 45 MIN: CPT | Performed by: PHYSICIAN ASSISTANT

## 2025-06-18 ASSESSMENT — ENCOUNTER SYMPTOMS
DIARRHEA: 0
COLOR CHANGE: 0
GASTROINTESTINAL NEGATIVE: 1
CHEST TIGHTNESS: 0
CONSTIPATION: 0
NAUSEA: 0
ABDOMINAL PAIN: 0
VOMITING: 0
ABDOMINAL DISTENTION: 0
COUGH: 0
RESPIRATORY NEGATIVE: 1
SHORTNESS OF BREATH: 0
APNEA: 0

## 2025-06-18 NOTE — PATIENT INSTRUCTIONS
PATIENTIQ:  PatientIQ helps [facility] stay in touch with you to know how you're feeling, and provides education and care instructions to you at various time points.   Your answers help your care team track your progress to provide the best care possible. PatientIQ will contact you pre-op and post-op via email or text with:  Educational Videos and Care Instructions  Questionnaires About How You're Feeling    Your participation provides you valuable education and helps [facility] continue to provide quality care to all patients. Thank you

## 2025-06-18 NOTE — PROGRESS NOTES
[facility] ORTHOPEDICS AND SPORTS MEDICINE  [address]  [place] [address]  Dept: [phone]  Dept Fax: [phone]        Bilateral hand- New Patient- ED one click      Subjective:   Ryland Joshi is a 39 y.o. year old male who presents to our office today for routine followup regarding his   1. Bilateral carpal tunnel syndrome    2. Tendinitis of finger of left hand    .    Chief Complaint   Patient presents with    Follow-up     ED OneClick New Patient-Left Hand/Wrist pain       History of Present Illness  The patient presents for evaluation of left hand pain.    He is right-handed and was evaluated in the emergency department on 06/17/2025 for left hand pain/stiffness. He reports no specific injury but recalls an incident a few weeks ago where he experienced difficulty bending his fingers after using a small screwdriver for approximately 5 minutes. He speculates that this could be indicative of carpal tunnel syndrome or arthritis. He does experience any numbness or tingling in the medial nerve distribution but reports occasional clicking or popping sensations in his joints.  It does wake him up at night and he has to shake out his hands to make it better.  He also mentions frequent muscle spasms in various parts of his body, including his ribs and legs, which sometimes render him unable to stand. He maintains adequate hydration, primarily consuming water and Gatorade at work, with occasional consumption of soda. He smokes cigarettes and drinks beer.     He has a history of arthritis diagnosed several years ago. He also has a history of sciatica and protruding discs in his back causing nerve impingement. He is not diabetic but has a family history of diabetes on both sides. His last diabetes screening was in 2023. He experiences intermittent numbness in his hands at night, depending on his sleeping position. He was prescribed

## 2025-06-27 ENCOUNTER — TELEPHONE (OUTPATIENT)
Dept: ORTHOPEDIC SURGERY | Age: 40
End: 2025-06-27

## 2025-06-30 DIAGNOSIS — G56.03 BILATERAL CARPAL TUNNEL SYNDROME: Primary | ICD-10-CM

## 2025-07-10 ENCOUNTER — TELEPHONE (OUTPATIENT)
Dept: ORTHOPEDIC SURGERY | Age: 40
End: 2025-07-10

## 2025-07-10 NOTE — TELEPHONE ENCOUNTER
Patient has a EMG scheduled for November, which is the earliest he can get in. Patient wonders if an injection can be done before EMG is done since he is in so much pain.

## 2025-07-10 NOTE — TELEPHONE ENCOUNTER
Patient's wife states that they scheduled his EMG appointment but that he cannot be seen until November so they are wondering about treatment options, in the meantime. She asks if an injection would help him since he is in a lot of pain? Please advise. His wife, Shazia can be reached at [phone] (she says the patient works all day and cannot receive phone calls). Thank you.

## 2025-07-12 ENCOUNTER — HOSPITAL ENCOUNTER (EMERGENCY)
Facility: CLINIC | Age: 40
Discharge: HOME | End: 2025-07-12
Attending: EMERGENCY MEDICINE
Payer: MEDICAID

## 2025-07-12 ENCOUNTER — APPOINTMENT (OUTPATIENT)
Dept: GENERAL RADIOLOGY | Facility: CLINIC | Age: 40
End: 2025-07-12
Payer: MEDICAID

## 2025-07-12 VITALS
OXYGEN SATURATION: 95 % | BODY MASS INDEX: 37.67 KG/M2 | WEIGHT: 240 LBS | DIASTOLIC BLOOD PRESSURE: 92 MMHG | TEMPERATURE: 98.3 F | SYSTOLIC BLOOD PRESSURE: 166 MMHG | HEIGHT: 67 IN | HEART RATE: 76 BPM | RESPIRATION RATE: 16 BRPM

## 2025-07-12 DIAGNOSIS — J40 BRONCHITIS: Primary | ICD-10-CM

## 2025-07-12 PROCEDURE — 99283 EMERGENCY DEPT VISIT LOW MDM: CPT

## 2025-07-12 PROCEDURE — 71045 X-RAY EXAM CHEST 1 VIEW: CPT

## 2025-07-12 PROCEDURE — 6370000000 HC RX 637 (ALT 250 FOR IP): Performed by: PHYSICIAN ASSISTANT

## 2025-07-12 RX ORDER — PREDNISONE 20 MG/1
20 TABLET ORAL 2 TIMES DAILY
Qty: 10 TABLET | Refills: 0 | Status: SHIPPED | OUTPATIENT
Start: 2025-07-12 | End: 2025-07-17

## 2025-07-12 RX ORDER — IPRATROPIUM BROMIDE AND ALBUTEROL SULFATE 2.5; .5 MG/3ML; MG/3ML
1 SOLUTION RESPIRATORY (INHALATION) ONCE
Status: COMPLETED | OUTPATIENT
Start: 2025-07-12 | End: 2025-07-12

## 2025-07-12 RX ORDER — GUAIFENESIN 600 MG/1
600 TABLET, EXTENDED RELEASE ORAL 2 TIMES DAILY
Qty: 30 TABLET | Refills: 0 | Status: SHIPPED | OUTPATIENT
Start: 2025-07-12 | End: 2025-07-27

## 2025-07-12 RX ORDER — PREDNISONE 20 MG/1
60 TABLET ORAL ONCE
Status: COMPLETED | OUTPATIENT
Start: 2025-07-12 | End: 2025-07-12

## 2025-07-12 RX ORDER — BENZONATATE 100 MG/1
100 CAPSULE ORAL 3 TIMES DAILY PRN
Qty: 30 CAPSULE | Refills: 0 | Status: SHIPPED | OUTPATIENT
Start: 2025-07-12 | End: 2025-07-19

## 2025-07-12 RX ADMIN — IPRATROPIUM BROMIDE AND ALBUTEROL SULFATE 1 DOSE: .5; 2.5 SOLUTION RESPIRATORY (INHALATION) at 17:24

## 2025-07-12 RX ADMIN — PREDNISONE 60 MG: 20 TABLET ORAL at 17:24

## 2025-07-12 ASSESSMENT — PAIN DESCRIPTION - DESCRIPTORS: DESCRIPTORS: ACHING

## 2025-07-12 ASSESSMENT — PAIN SCALES - GENERAL: PAINLEVEL_OUTOF10: 2

## 2025-07-12 ASSESSMENT — PAIN - FUNCTIONAL ASSESSMENT: PAIN_FUNCTIONAL_ASSESSMENT: 0-10

## 2025-07-12 ASSESSMENT — PAIN DESCRIPTION - PAIN TYPE: TYPE: ACUTE PAIN

## 2025-07-12 ASSESSMENT — PAIN DESCRIPTION - LOCATION: LOCATION: GENERALIZED

## 2025-07-12 NOTE — ED PROVIDER NOTES
[facility]  eMERGENCY dEPARTMENT eNCOUnter      Pt Name: Ryland Joshi  MRN: [id number]  Birthdate 1985  Date of evaluation: 7/12/2025  Provider: Crisitano Lane PA-C    CHIEF COMPLAINT       Chief Complaint   Patient presents with    Cough     Green/clear sputum    Nasal Congestion    Shortness of Breath           HISTORY OF PRESENT ILLNESS  (Location/Symptom, Timing/Onset, Context/Setting, Quality, Duration, Modifying Factors, Severity.)   Ryland Joshi is a 39 y.o. male who presents to the emergency department with productive cough for the past 3 days.  He is here with similar illnesses with family members in the house.  3 other family members who  being seen by myself for the same complaints  Patient states that he smokes about a pack a day.  Denies any shortness of breath, chest pain, fevers, chills, abdominal pain, nausea, vomiting.    Location/Symptom: cough  Duration: Intermittent  Modifying Factors: worse when laying flat, better sitting up  Severity: mild    Nursing Notes were reviewed.    REVIEW OF SYSTEMS    (2-9 systems for level 4, 10 or more for level 5)     Review of Systems   C/o cough  denies shortness of breath, denies chest pain, denies any fevers or chills, denies any abdominal pain nausea or vomiting    Except as noted above the remainder of the review of systems was reviewed and negative.       PAST MEDICAL HISTORY     Past Medical History:   Diagnosis Date    Essential hypertension 6/12/2023     None otherwise stated in nurses notes    SURGICAL HISTORY       Past Surgical History:   Procedure Laterality Date    CYST REMOVAL      FEMUR FRACTURE SURGERY      NERVE BLOCK  07/17/2020    Procedure: LUMBAR TRANSFORAMINAL # 1 L3 & L4 (Left )     PAIN MANAGEMENT PROCEDURE Left 7/17/2020    LUMBAR TRANSFORAMINAL # 1 L3 & L[facility]    PAIN MANAGEMENT PROCEDURE  07/31/2020    : LUMBAR TRANSFORAMINAL - #2 L3-L4 LEFT (Left )      PAIN MANAGEMENT PROCEDURE Left 2020    LUMBAR TRANSFORAMINAL - #2 L3-L4 LEFT performed by Kay Lopez MD at [facility] OR     None otherwise stated in nurses notes    CURRENT MEDICATIONS       Discharge Medication List as of 2025  6:21 PM        CONTINUE these medications which have NOT CHANGED    Details   naproxen (NAPROSYN) 500 MG tablet Take 1 tablet by mouth 2 times daily (with meals), Disp-60 tablet, R-1Normal      ibuprofen (ADVIL;MOTRIN) 800 MG tablet Take 1 tablet by mouth every 8 hours as needed for Pain, Disp-30 tablet, R-0Normal      pantoprazole (PROTONIX) 40 MG tablet Take 1 tablet by mouth daily, Disp-90 tablet, R-3Normal      lisinopril-hydroCHLOROthiazide (PRINZIDE;ZESTORETIC) 10-12.5 MG per tablet Take 1 tablet by mouth daily, Disp-90 tablet, R-3Normal             ALLERGIES     Patient has no known allergies.    FAMILY HISTORY           Problem Relation Age of Onset    Diabetes Father         Type 1    Heart Disease Maternal Grandmother     Diabetes Maternal Grandfather      Family Status   Relation Name Status    Father  Alive    Mother  Alive    MGM      MGF  Alive   No partnership data on file      None otherwise stated in nurses notes    SOCIAL HISTORY      reports that he has been smoking cigarettes. He started smoking about 29 years ago. He has a 29.9 pack-year smoking history. He has never used smokeless tobacco. He reports current alcohol use. He reports that he does not use drugs.   lives at home with others     PHYSICAL EXAM    (up to 7 for level 4, 8 or more for level 5)     ED Triage Vitals [25 1714]   BP Systolic BP Percentile Diastolic BP Percentile Temp Temp Source Pulse Respirations SpO2   (!) 166/92 -- -- 98.3 °F (36.8 °C) Oral 76 16 95 %      Height Weight - Scale         1.702 m (5' 7\") 108.9 kg (240 lb)             Physical Exam   Nursing note and vitals reviewed.  Constitutional: Oriented to person, place, and time and well-developed,

## 2025-07-12 NOTE — ED NOTES
Mode of arrival (squad #, walk in, police, etc) : walk in, from home        Chief complaint(s): sough, congestion, shortness of breath        Arrival Note (brief scenario, treatment PTA, etc).: Pt presented to the ED c/o above symptoms. Reports this has been ongoing for the past week. Reports that he keeps his house pretty cold, and that he works outside, so he is unsure if he has fevers or chills. Reports that he is also having some minor generalized bodyaches, and he started having diarrhea today. Reports that he is a daily smoker, and that he had asthma in the past and was on medication, but stopped the medication. Pt alert and oriented, PWD, PMS intact.         C= \"Have you ever felt that you should Cut down on your drinking?\"  No  A= \"Have people Annoyed you by criticizing your drinking?\"  No  G= \"Have you ever felt bad or Guilty about your drinking?\"  No  E= \"Have you ever had a drink as an Eye-opener first thing in the morning to steady your nerves or to help a hangover?\"  No      Deferred []      Reason for deferring: N/A    *If yes to two or more: probable alcohol abuse.*

## 2025-07-12 NOTE — ED PROVIDER NOTES
eMERGENCY dEPARTMENT  Attending Physician Attestation     Pt Name: Ryland Joshi  MRN: [id number]  Birthdate 1985  Date of evaluation: 7/12/25     Ryland Joshi is a 39 y.o. male with CC: Cough (Green/clear sputum), Nasal Congestion, and Shortness of Breath      I was available to render services if needed but did not directly participate in the care of the patient.    Vitals Reviewed:    Vitals:    07/12/25 1714   BP: (!) 166/92   Pulse: 76   Resp: 16   Temp: 98.3 °F (36.8 °C)   TempSrc: Oral   SpO2: 95%   Weight: 108.9 kg (240 lb)   Height: 1.702 m (5' 7\")       Initial Pain Score: Pain Level: 2 (07/12/25 1714)  Last Pain Score: Pain Level: 2 (07/12/25 1714)    Sean Tovar MD  Attending Emergency Physician                Sean Tovar MD  07/12/25 6583

## (undated) DEVICE — GLOVE SURG SZ 55 THK91MIL ORANGE  LTX FREE SYN POLYISOPRENE

## (undated) DEVICE — GLOVE ORANGE PI 8   MSG9080

## (undated) DEVICE — MARKER,SKIN,WI/RULER AND LABELS: Brand: MEDLINE

## (undated) DEVICE — TOWEL,OR,DSP,ST,NATURAL,DLX,4/PK,20PK/CS: Brand: MEDLINE

## (undated) DEVICE — SET EXTN SM 0.5ML L13IN BOR W/O INJ SITE

## (undated) DEVICE — TRAY NRV BLK SUPP CUST

## (undated) DEVICE — Z DISCONTINUED APPLICATOR SURG PREP 0.35OZ 2% CHG 70% ISO ALC W/ HI LT

## (undated) DEVICE — BANDAGE ADH W0.75XL3IN NAT PLAS CURAD

## (undated) DEVICE — NEEDLE FLTR 18GA L1.5IN MEM THK5UM BLNT DISP

## (undated) DEVICE — SYRINGE MED 10ML LUERLOCK TIP W/O SFTY DISP

## (undated) DEVICE — NEEDLE SPNL SPROTTE 22 G X 6IN

## (undated) DEVICE — NEEDLE: Brand: SPROTTE®